# Patient Record
Sex: FEMALE | Race: BLACK OR AFRICAN AMERICAN | Employment: OTHER | URBAN - METROPOLITAN AREA
[De-identification: names, ages, dates, MRNs, and addresses within clinical notes are randomized per-mention and may not be internally consistent; named-entity substitution may affect disease eponyms.]

---

## 2017-02-16 ENCOUNTER — OFFICE VISIT (OUTPATIENT)
Dept: ENDOCRINOLOGY | Age: 62
End: 2017-02-16

## 2017-02-16 VITALS
BODY MASS INDEX: 26.05 KG/M2 | SYSTOLIC BLOOD PRESSURE: 157 MMHG | HEIGHT: 63 IN | WEIGHT: 147 LBS | RESPIRATION RATE: 16 BRPM | DIASTOLIC BLOOD PRESSURE: 59 MMHG | TEMPERATURE: 98.6 F | HEART RATE: 66 BPM

## 2017-02-16 DIAGNOSIS — E78.2 MIXED HYPERLIPIDEMIA: ICD-10-CM

## 2017-02-16 DIAGNOSIS — L97.513 RIGHT FOOT ULCER, WITH NECROSIS OF MUSCLE (HCC): ICD-10-CM

## 2017-02-16 DIAGNOSIS — I10 ESSENTIAL HYPERTENSION: ICD-10-CM

## 2017-02-16 DIAGNOSIS — E10.9 TYPE 1 DIABETES MELLITUS WITHOUT COMPLICATION (HCC): Primary | ICD-10-CM

## 2017-02-16 DIAGNOSIS — I73.9 PAD (PERIPHERAL ARTERY DISEASE) (HCC): ICD-10-CM

## 2017-02-16 LAB
GLUCOSE POC: 125 MG/DL
HBA1C MFR BLD HPLC: 8.2 %

## 2017-02-16 RX ORDER — GABAPENTIN 300 MG/1
CAPSULE ORAL
Qty: 90 CAP | Refills: 6 | Status: SHIPPED | OUTPATIENT
Start: 2017-02-16 | End: 2018-02-14 | Stop reason: SDUPTHER

## 2017-02-16 RX ORDER — FLUCONAZOLE 150 MG/1
TABLET ORAL
Qty: 2 TAB | Refills: 1 | Status: SHIPPED | OUTPATIENT
Start: 2017-02-16 | End: 2018-05-22 | Stop reason: ALTCHOICE

## 2017-02-16 RX ORDER — METFORMIN HYDROCHLORIDE 1000 MG/1
1000 TABLET ORAL 2 TIMES DAILY WITH MEALS
Qty: 60 TAB | Refills: 6 | Status: SHIPPED | OUTPATIENT
Start: 2017-02-16 | End: 2018-02-14 | Stop reason: SDUPTHER

## 2017-02-16 NOTE — PROGRESS NOTES
Eye exam: sept 2016  Foot exam: within last year    Lab Results   Component Value Date/Time    Hemoglobin A1c 7.7 05/18/2015 09:52 AM    Hemoglobin A1c (POC) 7.3 08/31/2016 11:35 AM     Wt Readings from Last 3 Encounters:   02/16/17 147 lb (66.7 kg)   08/31/16 140 lb (63.5 kg)   02/11/16 148 lb (67.1 kg)     Temp Readings from Last 3 Encounters:   02/16/17 98.6 °F (37 °C) (Oral)   08/31/16 97.8 °F (36.6 °C) (Oral)   02/11/16 97.3 °F (36.3 °C) (Oral)     BP Readings from Last 3 Encounters:   02/16/17 157/59   08/31/16 138/74   02/11/16 142/70     Pulse Readings from Last 3 Encounters:   02/16/17 66   08/31/16 72   02/11/16 69

## 2017-02-16 NOTE — MR AVS SNAPSHOT
Visit Information Date & Time Provider Department Dept. Phone Encounter #  
 2/16/2017  3:45 PM Myles Foster MD [facility] [phone] [id number] Follow-up Instructions Return in about 3 months (around 5/16/2017). Upcoming Health Maintenance Date Due Hepatitis C Screening 1955 Pneumococcal 19-64 Medium Risk (1 of 1 - PPSV23) 7/11/1974 DTaP/Tdap/Td series (1 - Tdap) 7/11/1976 PAP AKA CERVICAL CYTOLOGY 7/11/1976 BREAST CANCER SCRN MAMMOGRAM 7/11/2005 FOBT Q 1 YEAR AGE 50-75 7/11/2005 ZOSTER VACCINE AGE 60> 7/11/2015 FOOT EXAM Q1 1/22/2016 LIPID PANEL Q1 5/18/2016 INFLUENZA AGE 9 TO ADULT 8/1/2016 HEMOGLOBIN A1C Q6M 2/28/2017 MICROALBUMIN Q1 8/31/2017 EYE EXAM RETINAL OR DILATED Q1 9/20/2017 Allergies as of 2/16/2017  Review Complete On: 2/16/2017 By: Myles Foster MD  
  
 Severity Noted Reaction Type Reactions Levaquin [Levofloxacin]  10/17/2014    Rash Pcn [Penicillins]  05/07/2012    Hives Robaxin [Methocarbamol]  10/17/2014    Rash Patient not allergic Rocephin [Ceftriaxone]  09/14/2015    Rash Current Immunizations  Never Reviewed No immunizations on file. Not reviewed this visit You Were Diagnosed With   
  
 Codes Comments Type 1 diabetes mellitus without complication (HCC)    -  Primary ICD-10-CM: E10.9 ICD-9-CM: 250.01   
 PAD (peripheral artery disease) (HCC)     ICD-10-CM: I73.9 ICD-9-CM: 443.9 Right foot ulcer, with necrosis of muscle ([facility] [address]     ICD-10-CM: J65.146 ICD-9-CM: 707.15 Vitals BP Pulse Temp Resp Height(growth percentile) Weight(growth percentile) 157/59 (BP 1 Location: Left arm, BP Patient Position: Sitting) 66 98.6 °F (37 °C) (Oral) 16 5' 3\" (1.6 m) 147 lb (66.7 kg) BMI OB Status Smoking Status 26.04 kg/m2 Postmenopausal Current Every Day Smoker Vitals History BMI and BSA Data Body Mass Index Body Surface Area 26.04 kg/m 2 1.72 m 2 Preferred Pharmacy Pharmacy Name Phone [facility], [address] [place] [phone] Your Updated Medication List  
  
   
This list is accurate as of: 2/16/17  4:30 PM.  Always use your most recent med list.  
  
  
  
  
 ANORO ELLIPTA 62.5-25 mcg/actuation inhaler Generic drug:  umeclidinium-vilanterol  
  
 aspirin 81 mg tablet Take 81 mg by mouth daily. FIRST-TESTOSTERONE 2 % Oint Generic drug:  testosterone propionate  
by TransDERmal route. FISH OIL 1,000 mg Cap Generic drug:  omega-3 fatty acids-vitamin e Take 1 Cap by mouth daily. fluconazole 150 mg tablet Commonly known as:  DIFLUCAN Take 1 tablet by mouth for 2 days  
  
 gabapentin 300 mg capsule Commonly known as:  NEURONTIN  
TAKE 1 CAPSULE BY MOUTH IN THE MORNING AND 2 CAPSULES AT BEDTIME  
  
 GINKOBA PO Take 1 Tab by mouth daily. GINSENG PO Take 1 Tab by mouth daily. * glucose blood VI test strips strip Commonly known as:  ONE TOUCH TEST Test 4 times daily, Dx. Code 250.00  
  
 * ONETOUCH ULTRA TEST strip Generic drug:  glucose blood VI test strips USE TO TEST FOUR TIMES DAILY  
  
 * CONTOUR NEXT STRIPS strip Generic drug:  glucose blood VI test strips USE TO TEST FIVE TIMES DAILY  
  
 * insulin detemir 100 unit/mL (3 mL) Inpn Commonly known as:  Rosita Never Inject 20 units at bedtime * LEVEMIR FLEXPEN 100 unit/mL (3 mL) Inpn Generic drug:  insulin detemir INJECT 20 UNITS AT BEDTIME  
  
 * insulin glulisine 100 unit/mL pen Commonly known as:  Asha Whitt Inject 3 units before breakfast, 3 units before lunch, and 5 units before dinner. Plus sliding scale * insulin glulisine 100 unit/mL injection Commonly known as:  APIDRA To use in Pump max daily units 50 * insulin glulisine 100 unit/mL injection Commonly known as:  APIDRA To use in Pump 50 units daily Max Insulin Needles (Disposable) 32 gauge x 1/4\" Ndle Commonly known as:  NOVOFINE 32 Use 4 times daily INVOKANA 300 mg tablet Generic drug:  canagliflozin TAKE 1 TABLET BY MOUTH DAILY STOP INVOKANA 100 MG Iron 325 mg (65 mg iron) tablet Generic drug:  ferrous sulfate Take  by mouth Daily (before breakfast). Lancets Misc Commonly known as: One Touch Norcatur Copping Test 4 times daily, Dx. Code 250.00  
  
 losartan 25 mg tablet Commonly known as:  COZAAR Take  by mouth daily. metFORMIN 1,000 mg tablet Commonly known as:  GLUCOPHAGE Take 1 Tab by mouth two (2) times daily (with meals). Stop Glucovance  
  
 omeprazole 40 mg capsule Commonly known as:  PRILOSEC TK 1 C PO QD  
  
 ONE-A-DAY MENOPAUSE HEALTH PO Take 1 Tab by mouth daily. oxyCODONE-acetaminophen 5-325 mg per tablet Commonly known as:  PERCOCET Take 1 Tab by mouth every eight (8) hours as needed for Pain. Max Daily Amount: 3 Tabs. PROAIR HFA 90 mcg/actuation inhaler Generic drug:  albuterol  
  
 simvastatin 10 mg tablet Commonly known as:  ZOCOR  
  
 tiZANidine 4 mg tablet Commonly known as:  ZANAFLEX  
  
 traZODone 100 mg tablet Commonly known as:  Aj Handy Take 100 mg by mouth nightly. TRINTELLIX 20 mg tablet Generic drug:  vortioxetine Take  by mouth daily. * ESTRACE 0.01 % (0.1 mg/gram) vaginal cream  
Generic drug:  estradiol * Jeanette Reeve Insert  into vagina BID Mon Wed & Fri.  
  
 valACYclovir 500 mg tablet Commonly known as:  VALTREX  
  
 venlafaxine 37.5 mg tablet Commonly known as:  EFFEXOR  
  
 VITAMIN B-12 1,000 mcg tablet Generic drug:  cyanocobalamin Take 5,000 mcg by mouth daily. VITAMIN C 1,000 mg tablet Generic drug:  ascorbic acid (vitamin C) Take  by mouth. VITAMIN D3 1,000 unit tablet Generic drug:  cholecalciferol Take  by mouth daily. * Notice: This list has 10 medication(s) that are the same as other medications prescribed for you. Read the directions carefully, and ask your doctor or other care provider to review them with you. We Performed the Following AMB POC GLUCOSE BLOOD, BY GLUCOSE MONITORING DEVICE [52431 CPT(R)] AMB POC HEMOGLOBIN A1C [88015 CPT(R)] CBC WITH AUTOMATED DIFF [90984 CPT(R)] LIPID PANEL [66989 CPT(R)] METABOLIC PANEL, COMPREHENSIVE [08249 CPT(R)] MICROALBUMIN, UR, RAND W/ MICROALBUMIN/CREA RATIO [id number] CPT(R)] TSH 3RD GENERATION [31436 CPT(R)] Follow-up Instructions Return in about 3 months (around 5/16/2017). To-Do List   
 02/16/2017 Imaging:  ANKLE BRACHIAL INDEX Patient Instructions Keep on diflucan 150 mg once  A day for 2 days  ( 6 refills ) Introducing [facility] SERVICES! Juan Blancas introduces [employer] patient portal. Now you can access parts of your medical record, email your doctor's office, and request medication refills online. 1. In your internet browser, go to [email or web address] 2. Click on the First Time User? Click Here link in the Sign In box. You will see the New Member Sign Up page. 3. Enter your Stretch Access Code exactly as it appears below. You will not need to use this code after youve completed the sign-up process. If you do not sign up before the expiration date, you must request a new code. · St[employer] Access Code: [email or web address] Expires: 5/17/2017  4:29 PM 
 
4. Enter the last four digits of your Social Security Number (xxxx) and Date of Birth (mm/dd/yyyy) as indicated and click Submit. You will be taken to the next sign-up page. 5. Create a Stretch ID. This will be your Stretch login ID and cannot be changed, so think of one that is secure and easy to remember. 6. Create a Stretch password. You can change your password at any time. 7. Enter your Password Reset Question and Answer. This can be used at a later time if you forget your password. 8. Enter your e-mail address. You will receive e-mail notification when new information is available in [address] 9. Click Sign Up. You can now view and download portions of your medical record. 10. Click the Download Summary menu link to download a portable copy of your medical information. If you have questions, please visit the Frequently Asked Questions section of the [facility] website. Remember, [facility] is NOT to be used for urgent needs. For medical emergencies, dial 911. Now available from your iPhone and Android! Please provide this summary of care documentation to your next provider. Your primary care clinician is listed as Tara Flores. If you have any questions after today's visit, please call [phone].

## 2017-02-16 NOTE — PROGRESS NOTES
HISTORY OF PRESENT ILLNESS     Nayeli Doty is a 64 y.o. female. HPI   Patient is here for f/u visit of Type 2 diabetes mellitus after aug  2016    She got started on insulin pump May 28 2015  Gained 7  lbs     She has a right foot issue being dealt by podiatrist - dec 2016    She has pain in both legs for a year     She continues to feel  depressed   She has not checked sugars for some time   She is smoking    She got off tegretol   She is f/u Dr Yolette Hernandez    She has good log and not many low sugars       Prior history . H/o DM 2 for 17 years   Current A1C is 12 % and symptoms/problems include fluctuating blood sugars   Current diabetic medications include Lantus 15 units and glucovance   Current monitoring regimen: home blood tests - 2 times daily   Home blood sugar records: trend: fluctuating a lot   Any episodes of hypoglycemia? no         Review of Systems   Constitutional: Negative. HENT: Negative. Eyes: Negative for pain and redness. Respiratory: Negative. Cardiovascular: Negative for chest pain, palpitations and leg swelling. Gastrointestinal: Negative. Negative for constipation. Genitourinary: Negative. Musculoskeletal: Negative for myalgias. Skin: Negative. Neurological: Negative. Endo/Heme/Allergies: Negative. Psychiatric/Behavioral: Negative for depression and memory loss. The patient does not have insomnia. Physical Exam   Constitutional: She is oriented to person, place, and time. She appears well-developed and well-nourished. HENT:   Head: Normocephalic. Eyes: Conjunctivae and EOM are normal. Pupils are equal, round, and reactive to light. Neck: Normal range of motion. Neck supple. No JVD present. No tracheal deviation present. No thyromegaly present. Cardiovascular: Normal rate, regular rhythm and normal heart sounds. No murmur heard. Pulmonary/Chest: Breath sounds normal.   Abdominal: Soft.  Bowel sounds are normal.   Musculoskeletal: Normal range of motion. Lymphadenopathy:   She has no cervical adenopathy. Neurological: She is alert and oriented to person, place, and time. She has normal reflexes. Skin: Skin is warm. Psychiatric: She has a normal mood and affect. S/p right  foot -  surgery   podiatrist - Dr. Robson Liang       Lab Results   Component Value Date/Time    Hemoglobin A1c 7.7 05/18/2015 09:52 AM    Hemoglobin A1c 9.2 02/03/2015 10:08 AM    Hemoglobin A1c 9.1 05/01/2014 09:10 AM    Glucose 130 08/31/2016 12:06 PM    Glucose (POC) 158 03/15/2013 11:14 AM    Glucose  02/16/2017 03:53 PM    Microalb/Creat ratio (ug/mg creat.) 163.5 08/31/2016 12:06 PM    LDL, calculated 80 05/18/2015 09:52 AM    Creatinine 0.67 08/31/2016 12:06 PM      Lab Results   Component Value Date/Time    Cholesterol, total 152 05/18/2015 09:52 AM    HDL Cholesterol 47 05/18/2015 09:52 AM    LDL, calculated 80 05/18/2015 09:52 AM    Triglyceride 126 05/18/2015 09:52 AM       Lab Results   Component Value Date/Time    ALT (SGPT) 15 08/31/2016 12:06 PM    AST (SGOT) 19 08/31/2016 12:06 PM    Alk. phosphatase 123 08/31/2016 12:06 PM    Bilirubin, total 0.4 08/31/2016 12:06 PM       Lab Results   Component Value Date/Time    GFR est  08/31/2016 12:06 PM    GFR est non-AA 95 08/31/2016 12:06 PM    Creatinine 0.67 08/31/2016 12:06 PM    BUN 9 08/31/2016 12:06 PM    Sodium 142 08/31/2016 12:06 PM    Potassium 4.1 08/31/2016 12:06 PM    Chloride 100 08/31/2016 12:06 PM    CO2 26 08/31/2016 12:06 PM                 ASSESSMENT and PLAN     1.  Type 2 DM, un controlled with type 1 behaviour  : A1c is   8.2 %    From  Feb 2017   Compared to     7.2 %    From today aug 2016  Compared to  7.7 %     From   May 2016  Compared to  6.3 %      From      Sept 2015   Compared to   7.7 %  From  May 2015 ;    9.2 %    From feb 2015  Compared to   9.1 %    From oct 2014  compared to  9.1 % from May 2014 compared to  8. 3 % from feb 2014 compared to 8.8 % June 2013 compared to 7.6 % from aug 2012 ; Compared to  over 12 % from may 2012 to current a1c is 7      Doing well on the pump - started in may 2015  Lost control as she does  not   Do  many checks and intake of insulin  Log showed  good sugars   She is forget ful    She is taking only 12 to 20 units  A day   Explained how she could be having post meal hyperglycemias   She is on meal time insulin and she requires lot of understanding of the need for meal time insulin, and low pancreatic function  She has done so  better on pump     Continue  invokana -- but its role is questionable and she decided to stay on it   Continue on metformin    LOW c-pep , AMANDA negative      advised about checking blood sugars 4 times a day and maintaining log book. The danger of having low blood sugars has been explained with inappropriate use of insulin Patient voiced understanding and using the printed instructions at home. Hypoglycemia management has been explained to the patient. lab results and schedule of future lab studies reviewed with patient     2. Hypoglycemia : provided the education    3. HTN : on cozaar   , Proteinuria noticeable   Lisinopril stopped for cough      4. Dyslipidemia : continue zocor. Patient is educated about benefits and adverse effects of statins and explained how benefits outweigh risk. 5. use of aspirin to prevent MI and TIA's discussed     6. On estrogen  Requesting refills on diflucan      7. Neuropathy :  She says she has no relief On  gabapentin to 300 AM  and 600 hs ( from 100, 100, 300 mg   Dosing)   Stopped  tramadol 50mg        8. Depression : she is f/u with Dr. Jose Nicole   She works at [employer] and she thinks that her depression is much deeper   She is on effexor   Suggesting vibryid       10.    PAD - Right foot recent surgery , she has pain constant in the leg   She is a smoker   Check out vascular status   Ordered CHRISTY   definitely needs vascular assessment given all the risk factors , smoking, diabetes, neuropathy , PAD  Refer to vascular surgeon         > 50 % visit time spent on counseling

## 2017-02-17 LAB
ALBUMIN SERPL-MCNC: 4 G/DL (ref 3.6–4.8)
ALBUMIN/CREAT UR: 278.2 MG/G CREAT (ref 0–30)
ALBUMIN/GLOB SERPL: 1.2 {RATIO} (ref 1.1–2.5)
ALP SERPL-CCNC: 120 IU/L (ref 39–117)
ALT SERPL-CCNC: 15 IU/L (ref 0–32)
AST SERPL-CCNC: 19 IU/L (ref 0–40)
BASOPHILS # BLD AUTO: 0 X10E3/UL (ref 0–0.2)
BASOPHILS NFR BLD AUTO: 0 %
BILIRUB SERPL-MCNC: 0.2 MG/DL (ref 0–1.2)
BUN SERPL-MCNC: 11 MG/DL (ref 8–27)
BUN/CREAT SERPL: 18 (ref 11–26)
CALCIUM SERPL-MCNC: 10.3 MG/DL (ref 8.7–10.3)
CHLORIDE SERPL-SCNC: 101 MMOL/L (ref 96–106)
CHOLEST SERPL-MCNC: 153 MG/DL (ref 100–199)
CO2 SERPL-SCNC: 23 MMOL/L (ref 18–29)
CREAT SERPL-MCNC: 0.61 MG/DL (ref 0.57–1)
CREAT UR-MCNC: 24.3 MG/DL
EOSINOPHIL # BLD AUTO: 0.1 X10E3/UL (ref 0–0.4)
EOSINOPHIL NFR BLD AUTO: 1 %
ERYTHROCYTE [DISTWIDTH] IN BLOOD BY AUTOMATED COUNT: 12.9 % (ref 12.3–15.4)
GLOBULIN SER CALC-MCNC: 3.3 G/DL (ref 1.5–4.5)
GLUCOSE SERPL-MCNC: 102 MG/DL (ref 65–99)
HCT VFR BLD AUTO: 45.1 % (ref 34–46.6)
HDLC SERPL-MCNC: 55 MG/DL
HGB BLD-MCNC: 15.2 G/DL (ref 11.1–15.9)
IMM GRANULOCYTES # BLD: 0.1 X10E3/UL (ref 0–0.1)
IMM GRANULOCYTES NFR BLD: 1 %
INTERPRETATION, 910389: NORMAL
LDLC SERPL CALC-MCNC: 77 MG/DL (ref 0–99)
LYMPHOCYTES # BLD AUTO: 2.9 X10E3/UL (ref 0.7–3.1)
LYMPHOCYTES NFR BLD AUTO: 41 %
MCH RBC QN AUTO: 29.6 PG (ref 26.6–33)
MCHC RBC AUTO-ENTMCNC: 33.7 G/DL (ref 31.5–35.7)
MCV RBC AUTO: 88 FL (ref 79–97)
MICROALBUMIN UR-MCNC: 67.6 UG/ML
MONOCYTES # BLD AUTO: 0.6 X10E3/UL (ref 0.1–0.9)
MONOCYTES NFR BLD AUTO: 9 %
NEUTROPHILS # BLD AUTO: 3.2 X10E3/UL (ref 1.4–7)
NEUTROPHILS NFR BLD AUTO: 48 %
PLATELET # BLD AUTO: 353 X10E3/UL (ref 150–379)
POTASSIUM SERPL-SCNC: 5.1 MMOL/L (ref 3.5–5.2)
PROT SERPL-MCNC: 7.3 G/DL (ref 6–8.5)
RBC # BLD AUTO: 5.13 X10E6/UL (ref 3.77–5.28)
SODIUM SERPL-SCNC: 142 MMOL/L (ref 134–144)
TRIGL SERPL-MCNC: 107 MG/DL (ref 0–149)
TSH SERPL DL<=0.005 MIU/L-ACNC: 1.44 UIU/ML (ref 0.45–4.5)
VLDLC SERPL CALC-MCNC: 21 MG/DL (ref 5–40)
WBC # BLD AUTO: 6.9 X10E3/UL (ref 3.4–10.8)

## 2017-02-24 ENCOUNTER — HOSPITAL ENCOUNTER (OUTPATIENT)
Dept: VASCULAR SURGERY | Age: 62
Discharge: HOME | End: 2017-02-24
Attending: INTERNAL MEDICINE
Payer: COMMERCIAL

## 2017-02-24 DIAGNOSIS — L97.513 RIGHT FOOT ULCER, WITH NECROSIS OF MUSCLE (HCC): ICD-10-CM

## 2017-02-24 DIAGNOSIS — I73.9 PAD (PERIPHERAL ARTERY DISEASE) (HCC): ICD-10-CM

## 2017-02-24 DIAGNOSIS — E10.9 TYPE 1 DIABETES MELLITUS WITHOUT COMPLICATION (HCC): ICD-10-CM

## 2017-02-24 PROCEDURE — 93923 UPR/LXTR ART STDY 3+ LVLS: CPT

## 2017-02-24 NOTE — PROCEDURES
[facility]  *** FINAL REPORT ***    Name: Nj Zimmerman  MRN: [id number]    Outpatient  : 1955  HIS Order #: [id number]  [id number] [facility] Visit #: [id number]  Date: 2017    TYPE OF TEST: Peripheral Arterial Testing    REASON FOR TEST  Claudication, Extremity ulceration    Right Leg  Segmentals: Abnormal                     mmHg  Brachial         139  High thigh       141  Low thigh        127  Calf              65  Posterior tibial  81  Dorsalis pedis    64  Peroneal  Metatarsal  Toe pressure  Doppler:  PVR:  Ankle/Brachial: 0.58    Left Leg  Segmentals: Abnormal                     mmHg  Brachial         139  High thigh       131  Low thigh         90  Calf              68  Posterior tibial  72  Dorsalis pedis    63  Peroneal  Metatarsal  Toe pressure  Doppler:  PVR:  Ankle/Brachial: 0.52    INTERPRETATION/FINDINGS  PROCEDURE:  Evaluation of lower extremity arteries with multilevel  systolic blood pressure measurements and pulse volume recording (PVR)  plethysmography. Includes calculation of the ankle/brachial pressure  indices (CHRISTY's). FINDINGS:  1. Moderate peripheral arterial disease indicated at rest in the right   leg. 2. Moderate peripheral arterial disease indicated at rest in the left  leg. 3. The right ankle/brachial index is 0.58 and the left ankle/brachial  index is 0.52.  4. The right toe/ankle brachial index is 0.44 and the left  toe/brachial index is 0.37. ADDITIONAL COMMENTS    I have personally reviewed the data relevant to the interpretation of  this  study. TECHNOLOGIST: Sherly Moya RVT  Signed: 2017 11:20 AM    PHYSICIAN: Jose Chow.  Amilcar Ortiz MD  Signed: 2017 09:02 AM

## 2017-02-28 NOTE — PROGRESS NOTES
Please inform pt that the CHRISTY test was done by the Dr. Neno Magaña I am planning to refer her to   So, mirza has to make appt for her.  I told mirza already

## 2017-03-02 ENCOUNTER — TELEPHONE (OUTPATIENT)
Dept: ENDOCRINOLOGY | Age: 62
End: 2017-03-02

## 2017-03-02 NOTE — TELEPHONE ENCOUNTER
Patient called and would like results of the test she had done at [facility]. Please call patient.

## 2017-03-03 NOTE — TELEPHONE ENCOUNTER
Notes Recorded by Eulalio Randolph MD on 2/27/2017 at 10:43 PM  Please inform pt that the CHRISTY test was done by the Dr. Sarwat Quinteros I am planning to refer her to   So, mirza has to make appt for her.  I told mirza already    Informed pt

## 2017-03-14 ENCOUNTER — TELEPHONE (OUTPATIENT)
Dept: ENDOCRINOLOGY | Age: 62
End: 2017-03-14

## 2017-03-14 NOTE — TELEPHONE ENCOUNTER
----- Message from Myles Foster MD sent at 2/27/2017 10:41 PM EST -----      Refer to Dr. Jolynn Romero for managing PAD

## 2017-05-03 ENCOUNTER — TELEPHONE (OUTPATIENT)
Dept: ENDOCRINOLOGY | Age: 62
End: 2017-05-03

## 2017-05-03 NOTE — TELEPHONE ENCOUNTER
Patient says Jayleen Quinn requires a prior authorization. Patient says she is completley out of medication .

## 2017-05-15 ENCOUNTER — OFFICE VISIT (OUTPATIENT)
Dept: ENDOCRINOLOGY | Age: 62
End: 2017-05-15

## 2017-05-15 VITALS
DIASTOLIC BLOOD PRESSURE: 72 MMHG | TEMPERATURE: 96.6 F | BODY MASS INDEX: 24.8 KG/M2 | SYSTOLIC BLOOD PRESSURE: 146 MMHG | HEIGHT: 63 IN | WEIGHT: 140 LBS | HEART RATE: 70 BPM | RESPIRATION RATE: 20 BRPM

## 2017-05-15 DIAGNOSIS — E10.9 TYPE 1 DIABETES MELLITUS WITHOUT COMPLICATION (HCC): Primary | ICD-10-CM

## 2017-05-15 DIAGNOSIS — I10 ESSENTIAL HYPERTENSION: ICD-10-CM

## 2017-05-15 DIAGNOSIS — Z96.41 INSULIN PUMP STATUS: ICD-10-CM

## 2017-05-15 DIAGNOSIS — I73.9 PAD (PERIPHERAL ARTERY DISEASE) (HCC): ICD-10-CM

## 2017-05-15 DIAGNOSIS — E78.2 MIXED HYPERLIPIDEMIA: ICD-10-CM

## 2017-05-15 LAB — HBA1C MFR BLD HPLC: 7.6 %

## 2017-05-15 NOTE — MR AVS SNAPSHOT
Visit Information Date & Time Provider Department Dept. Phone Encounter #  
 5/15/2017 10:45 AM Danica Martinez MD [facility] Diabetes & Endocrinology [phone] [id number] Follow-up Instructions Return in about 3 months (around 8/15/2017). Upcoming Health Maintenance Date Due Hepatitis C Screening 1955 Pneumococcal 19-64 Medium Risk (1 of 1 - PPSV23) 7/11/1974 DTaP/Tdap/Td series (1 - Tdap) 7/11/1976 PAP AKA CERVICAL CYTOLOGY 7/11/1976 BREAST CANCER SCRN MAMMOGRAM 7/11/2005 FOBT Q 1 YEAR AGE 50-75 7/11/2005 ZOSTER VACCINE AGE 60> 7/11/2015 FOOT EXAM Q1 1/22/2016 INFLUENZA AGE 9 TO ADULT 8/1/2017 HEMOGLOBIN A1C Q6M 8/16/2017 EYE EXAM RETINAL OR DILATED Q1 9/20/2017 MICROALBUMIN Q1 2/16/2018 LIPID PANEL Q1 2/16/2018 Allergies as of 5/15/2017  Review Complete On: 5/15/2017 By: Danica Martinez MD  
  
 Severity Noted Reaction Type Reactions Levaquin [Levofloxacin]  10/17/2014    Rash Pcn [Penicillins]  05/07/2012    Hives Robaxin [Methocarbamol]  10/17/2014    Rash Patient not allergic Rocephin [Ceftriaxone]  09/14/2015    Rash Current Immunizations  Never Reviewed No immunizations on file. Not reviewed this visit You Were Diagnosed With   
  
 Codes Comments Type 1 diabetes mellitus without complication (HCC)    -  Primary ICD-10-CM: E10.9 ICD-9-CM: 250.01 Insulin pump status     ICD-10-CM: Z96.41 
ICD-9-CM: V45.85 PAD (peripheral artery disease) (HCC)     ICD-10-CM: I73.9 ICD-9-CM: 443.9 Essential hypertension     ICD-10-CM: I10 
ICD-9-CM: 401.9 Mixed hyperlipidemia     ICD-10-CM: E78.2 ICD-9-CM: 272.2 Vitals BP Pulse Temp Resp Height(growth percentile) Weight(growth percentile) 146/72 70 96.6 °F (35.9 °C) (Oral) 20 5' 3\" (1.6 m) 140 lb (63.5 kg) BMI OB Status Smoking Status 24.8 kg/m2 Postmenopausal Current Every Day Smoker BMI and BSA Data Body Mass Index Body Surface Area  
 24.8 kg/m 2 1.68 m 2 Preferred Pharmacy Pharmacy Name Phone [facility], [address] María Mejia [phone] Your Updated Medication List  
  
   
This list is accurate as of: 5/15/17 11:51 AM.  Always use your most recent med list.  
  
  
  
  
 ANORO ELLIPTA 62.5-25 mcg/actuation inhaler Generic drug:  umeclidinium-vilanterol  
  
 aspirin 81 mg tablet Take 81 mg by mouth daily. empagliflozin 25 mg tablet Commonly known as:  Unk Prima Take 1 Tab by mouth daily. Stop invokana FIRST-TESTOSTERONE 2 % Oint Generic drug:  testosterone propionate  
by TransDERmal route. FISH OIL 1,000 mg Cap Generic drug:  omega-3 fatty acids-vitamin e Take 1 Cap by mouth daily. fluconazole 150 mg tablet Commonly known as:  DIFLUCAN Take 1 tablet by mouth for 2 days  
  
 gabapentin 300 mg capsule Commonly known as:  NEURONTIN  
TAKE 1 CAPSULE BY MOUTH IN THE MORNING AND 2 CAPSULES AT BEDTIME  
  
 * glucose blood VI test strips strip Commonly known as:  ONE TOUCH TEST Test 4 times daily, Dx. Code 250.00  
  
 * ONETOUCH ULTRA TEST strip Generic drug:  glucose blood VI test strips USE TO TEST FOUR TIMES DAILY  
  
 * CONTOUR NEXT STRIPS strip Generic drug:  glucose blood VI test strips USE TO TEST FIVE TIMES DAILY  
  
 * insulin detemir 100 unit/mL (3 mL) Inpn Commonly known as:  Catherine Kennel Inject 20 units at bedtime * LEVEMIR FLEXPEN 100 unit/mL (3 mL) Inpn Generic drug:  insulin detemir INJECT 20 UNITS AT BEDTIME  
  
 * insulin glulisine 100 unit/mL pen Commonly known as:  Erin Patel Inject 3 units before breakfast, 3 units before lunch, and 5 units before dinner. Plus sliding scale * insulin glulisine 100 unit/mL injection Commonly known as:  APIDRA To use in Pump max daily units 50 * insulin glulisine 100 unit/mL injection Commonly known as:  APIDRA To use in Pump 50 units daily Max Insulin Needles (Disposable) 32 gauge x 1/4\" Ndle Commonly known as:  NOVOFINE 32 Use 4 times daily Iron 325 mg (65 mg iron) tablet Generic drug:  ferrous sulfate Take  by mouth Daily (before breakfast). Lancets Misc Commonly known as: One Touch Deborah Primus Test 4 times daily, Dx. Code 250.00  
  
 losartan 25 mg tablet Commonly known as:  COZAAR Take  by mouth daily. metFORMIN 1,000 mg tablet Commonly known as:  GLUCOPHAGE Take 1 Tab by mouth two (2) times daily (with meals). Stop Glucovance  
  
 omeprazole 40 mg capsule Commonly known as:  PRILOSEC TK 1 C PO QD  
  
 ONE-A-DAY MENOPAUSE HEALTH PO Take 1 Tab by mouth daily. oxyCODONE-acetaminophen 5-325 mg per tablet Commonly known as:  PERCOCET Take 1 Tab by mouth every eight (8) hours as needed for Pain. Max Daily Amount: 3 Tabs. PROAIR HFA 90 mcg/actuation inhaler Generic drug:  albuterol  
  
 simvastatin 10 mg tablet Commonly known as:  ZOCOR  
  
 tiZANidine 4 mg tablet Commonly known as:  ZANAFLEX  
  
 traZODone 100 mg tablet Commonly known as:  Merleen Cables Take 100 mg by mouth nightly. * ESTRACE 0.01 % (0.1 mg/gram) vaginal cream  
Generic drug:  estradiol * Yun Christin Insert  into vagina BID Mon Wed & Fri.  
  
 valACYclovir 500 mg tablet Commonly known as:  VALTREX  
  
 VITAMIN B-12 1,000 mcg tablet Generic drug:  cyanocobalamin Take 5,000 mcg by mouth daily. VITAMIN C 1,000 mg tablet Generic drug:  ascorbic acid (vitamin C) Take  by mouth. VITAMIN D3 1,000 unit tablet Generic drug:  cholecalciferol Take  by mouth daily. * Notice: This list has 10 medication(s) that are the same as other medications prescribed for you. Read the directions carefully, and ask your doctor or other care provider to review them with you. Prescriptions Sent to Pharmacy Refills  
 empagliflozin (JARDIANCE) 25 mg tablet 6 Sig: Take 1 Tab by mouth daily. Stop invokana Class: Normal  
 Pharmacy: [facility] [address] [place] [address] AT [facility] [address] [facility] #: [phone] Route: Oral  
  
We Performed the Following AMB POC HEMOGLOBIN A1C [97071 CPT(R)] Follow-up Instructions Return in about 3 months (around 8/15/2017). Patient Instructions Pump -- apidra 2 vials a month ( used 50 units a day ) Continue  neurontin 300 mg AM,    and  600 mg at bed time Check blood sugars immediately before each meal and at bedtime Continue metformin Stop invokana 300 mg a day Start on jardiance 25 mg a day before b-fast  
 
 
 
 
BACK UP :  
 
Take Levemir insulin 20 units at bed time Take Apidra  insulin 3 units before breakfast, 3 units before lunch and 5 units before dinner. Also, add additional Apidra    as follows with meals  If blood sugars are[de-identified] 
 
150-200 mg 1 units 201-250 mg 2 units 251-300 mg 3 units 301-350 mg 4 units 351-400 mg 5 units 401-450 mg 6 units 451-500 mg 7 units Less than 70 mg NO INSULIN 
 
 
 
No grapes, oranges, peaches and pineapple Low on starches-- carbs, pasta rice and potatoes No snacks in between meal times Introducing [facility]! Dear Lyssa Uribe: 
Thank you for requesting a [employer] account. Our records indicate that you already have an active [employer] account. You can access your account anytime at [email or web address] Did you know that you can access your hospital and ER discharge instructions at any time in Col[employer]? You can also review all of your test results from your hospital stay or ER visit. Additional Information If you have questions, please visit the Frequently Asked Questions section of the [employer] website at [email or web address] Remember, [employer] is NOT to be used for urgent needs. For medical emergencies, dial 911. Now available from your iPhone and Android! Please provide this summary of care documentation to your next provider. Your primary care clinician is listed as Bo Nava. If you have any questions after today's visit, please call [phone].

## 2017-05-15 NOTE — PROGRESS NOTES
Wt Readings from Last 3 Encounters:   05/15/17 140 lb (63.5 kg)   02/16/17 147 lb (66.7 kg)   08/31/16 140 lb (63.5 kg)     Temp Readings from Last 3 Encounters:   05/15/17 96.6 °F (35.9 °C) (Oral)   02/16/17 98.6 °F (37 °C) (Oral)   08/31/16 97.8 °F (36.6 °C) (Oral)     BP Readings from Last 3 Encounters:   05/15/17 146/72   02/16/17 157/59   08/31/16 138/74     Pulse Readings from Last 3 Encounters:   05/15/17 70   02/16/17 66   08/31/16 72     Lab Results   Component Value Date/Time    Hemoglobin A1c 7.7 05/18/2015 09:52 AM    Hemoglobin A1c (POC) 8.2 02/16/2017 03:53 PM     Last Podiatry March 2017  Last Eye exam Aug 2016

## 2017-05-15 NOTE — PATIENT INSTRUCTIONS
Pump -- apidra 2 vials a month ( used 50 units a day )      Continue  neurontin 300 mg AM,    and  600 mg at bed time         Check blood sugars immediately before each meal and at bedtime       Continue metformin    Stop invokana 300 mg a day   Start on jardiance 25 mg a day before b-fast           BACK UP :     Take Levemir insulin 20 units at bed time       Take Apidra  insulin 3 units before breakfast, 3 units before lunch and 5 units before dinner.     Also, add additional Apidra    as follows with meals  If blood sugars are[de-identified]    150-200 mg 1 units    201-250 mg 2 units    251-300 mg 3 units    301-350 mg 4 units    351-400 mg 5 units    401-450 mg 6 units    451-500 mg 7 units     Less than 70 mg NO INSULIN        No grapes, oranges, peaches and pineapple   Low on starches-- carbs, pasta rice and potatoes   No snacks in between meal times

## 2017-05-15 NOTE — PROGRESS NOTES
HISTORY OF PRESENT ILLNESS     Unruly Andrea is a 64 y.o. female. HPI   Patient is here for f/u visit of Type 2 diabetes mellitus after feb 2017         She got started on insulin pump May 28 2015  Lost  7  lbs   She thinks she is gaining weight   She is not happy that she is to change to jardiance  From invokana     She saw the vascular surgeon, Dr. Linda Goodson,  She has clogged artery on right leg     Going for stent put in    She is doing better with checks   She is smoking    She got off tegretol   She is f/u Dr Mildred Anderson    She has good log and not many low sugars       Prior history . H/o DM 2 for 17 years   Current A1C is 12 % and symptoms/problems include fluctuating blood sugars   Current diabetic medications include Lantus 15 units and glucovance   Current monitoring regimen: home blood tests - 2 times daily   Home blood sugar records: trend: fluctuating a lot   Any episodes of hypoglycemia? no         Review of Systems   Constitutional: Negative. HENT: Negative. Eyes: Negative for pain and redness. Respiratory: Negative. Cardiovascular: Negative for chest pain, palpitations and leg swelling. Gastrointestinal: Negative. Negative for constipation. Genitourinary: Negative. Musculoskeletal: Negative for myalgias. Skin: Negative. Neurological: Negative. Endo/Heme/Allergies: Negative. Psychiatric/Behavioral: Negative for depression and memory loss. The patient does not have insomnia. Physical Exam   Constitutional: She is oriented to person, place, and time. She appears well-developed and well-nourished. HENT:   Head: Normocephalic. Eyes: Conjunctivae and EOM are normal. Pupils are equal, round, and reactive to light. Neck: Normal range of motion. Neck supple. No JVD present. No tracheal deviation present. No thyromegaly present. Cardiovascular: Normal rate, regular rhythm and normal heart sounds. No murmur heard.    Pulmonary/Chest: Breath sounds normal.   Abdominal: Soft. Bowel sounds are normal.   Musculoskeletal: Normal range of motion. Lymphadenopathy:   She has no cervical adenopathy. Neurological: She is alert and oriented to person, place, and time. She has normal reflexes. Skin: Skin is warm. Psychiatric: She has a normal mood and affect. S/p right  foot -  surgery   podiatrist - Dr. Cruz Sprain       Lab Results   Component Value Date/Time    Hemoglobin A1c 7.7 05/18/2015 09:52 AM    Hemoglobin A1c 9.2 02/03/2015 10:08 AM    Hemoglobin A1c 9.1 05/01/2014 09:10 AM    Glucose 102 02/16/2017 04:33 PM    Glucose (POC) 158 03/15/2013 11:14 AM    Glucose  02/16/2017 03:53 PM    Microalb/Creat ratio (ug/mg creat.) 278.2 02/16/2017 04:33 PM    LDL, calculated 77 02/16/2017 04:33 PM    Creatinine 0.61 02/16/2017 04:33 PM      Lab Results   Component Value Date/Time    Cholesterol, total 153 02/16/2017 04:33 PM    HDL Cholesterol 55 02/16/2017 04:33 PM    LDL, calculated 77 02/16/2017 04:33 PM    Triglyceride 107 02/16/2017 04:33 PM       Lab Results   Component Value Date/Time    ALT (SGPT) 15 02/16/2017 04:33 PM    AST (SGOT) 19 02/16/2017 04:33 PM    Alk. phosphatase 120 02/16/2017 04:33 PM    Bilirubin, total 0.2 02/16/2017 04:33 PM       Lab Results   Component Value Date/Time    GFR est  02/16/2017 04:33 PM    GFR est non-AA 98 02/16/2017 04:33 PM    Creatinine 0.61 02/16/2017 04:33 PM    BUN 11 02/16/2017 04:33 PM    Sodium 142 02/16/2017 04:33 PM    Potassium 5.1 02/16/2017 04:33 PM    Chloride 101 02/16/2017 04:33 PM    CO2 23 02/16/2017 04:33 PM                 ASSESSMENT and PLAN     1.  Type 2 DM, un controlled with type 1 behaviour  : A1c is  7.7 %     From    Today May 2017    compared to   8.2 %    From  Feb 2017   Compared to     7.2 %    From today aug 2016  Compared to  7.7 %     From   May 2016  Compared to  6.3 %      From      Sept 2015   Compared to   7.7 %  From  May 2015 ;    9.2 %    From feb 2015  Compared to   9.1 %    From oct 2014  compared to  9.1 % from May 2014 compared to  8. 3 % from feb 2014 compared to 8.8 % June 2013 compared to 7.6 % from aug 2012 ; Compared to  over 12 % from may 2012 to current a1c is 7        Glycemic control is better   Doing well on the pump - started in may 2015  Likely form more  checks and intake of insulin  Log showed  good sugars       She is taking only 12  units  A day   She is on meal time insulin and she requires lot of understanding of the need for meal time insulin, and low pancreatic function  She has done so  better on pump     Continue  invokana -- but its role is questionable and she decided to stay on it   Continue on metformin    LOW c-pep , AMANDA negative      advised about checking blood sugars 4 times a day and maintaining log book. The danger of having low blood sugars has been explained with inappropriate use of insulin Patient voiced understanding and using the printed instructions at home. Hypoglycemia management has been explained to the patient. lab results and schedule of future lab studies reviewed with patient     2. Hypoglycemia : provided the education    3. HTN : on cozaar   , Proteinuria noticeable   Lisinopril stopped for cough      4. Dyslipidemia : continue zocor. Patient is educated about benefits and adverse effects of statins and explained how benefits outweigh risk. 5. use of aspirin to prevent MI and TIA's discussed     6. On estrogen  Requesting refills on diflucan      7. Neuropathy :  She says she has no relief On  gabapentin to 300 AM  and 600 hs ( from 100, 100, 300 mg   Dosing)   Stopped  tramadol 50mg        8. Depression : she is f/u with Dr. Farnsworth Gloss   She works at [employer] and she thinks that her depression is much deeper   She is on effexor   Suggesting vibryid       10.    PAD - Right foot recent surgery , she has pain constant in the leg   She is a smoker   Got Checked out vascular status by Dr. Mark Rocha for stent placement definitely needs vascular assessment given all the risk factors , smoking, diabetes, neuropathy , PAD  Refer to vascular surgeon         > 50 % visit time spent on counseling

## 2017-09-05 RX ORDER — INSULIN GLULISINE 100 [IU]/ML
INJECTION, SOLUTION SUBCUTANEOUS
Qty: 20 ML | Refills: 0 | Status: SHIPPED | OUTPATIENT
Start: 2017-09-05 | End: 2017-09-18 | Stop reason: SDUPTHER

## 2017-09-08 RX ORDER — INSULIN ASPART 100 [IU]/ML
INJECTION, SOLUTION INTRAVENOUS; SUBCUTANEOUS
Qty: 20 ML | Refills: 6 | Status: SHIPPED | OUTPATIENT
Start: 2017-09-08 | End: 2018-10-16 | Stop reason: ALTCHOICE

## 2017-09-12 DIAGNOSIS — E11.65 TYPE 2 DIABETES MELLITUS WITH HYPERGLYCEMIA, WITH LONG-TERM CURRENT USE OF INSULIN (HCC): Primary | ICD-10-CM

## 2017-09-12 DIAGNOSIS — Z79.4 TYPE 2 DIABETES MELLITUS WITH HYPERGLYCEMIA, WITH LONG-TERM CURRENT USE OF INSULIN (HCC): Primary | ICD-10-CM

## 2017-09-12 RX ORDER — INSULIN LISPRO 100 [IU]/ML
INJECTION, SOLUTION INTRAVENOUS; SUBCUTANEOUS
Qty: 2 VIAL | Refills: 6 | Status: SHIPPED | OUTPATIENT
Start: 2017-09-12 | End: 2018-10-16 | Stop reason: ALTCHOICE

## 2017-09-18 DIAGNOSIS — Z79.4 TYPE 2 DIABETES MELLITUS WITH HYPERGLYCEMIA, WITH LONG-TERM CURRENT USE OF INSULIN (HCC): Primary | ICD-10-CM

## 2017-09-18 DIAGNOSIS — E11.65 TYPE 2 DIABETES MELLITUS WITH HYPERGLYCEMIA, WITH LONG-TERM CURRENT USE OF INSULIN (HCC): Primary | ICD-10-CM

## 2017-09-18 NOTE — TELEPHONE ENCOUNTER
Spoke with patient and she asked why Arlin Muñoz was not covered. Explained to patient it is not formulary for her insurance and they cover humalog. Patient stated she was on humalog years ago and it caused her to gain weight. Told patient would complete a PA for Apidra with that information and will update her on outcome. Called patient and informed her Apidra is approved from 09/18/2017-08/18/2018. She verbalized understanding. Will resend Apidra prescription to pharmacy.

## 2017-11-03 RX ORDER — BLOOD SUGAR DIAGNOSTIC
STRIP MISCELLANEOUS
Qty: 200 STRIP | Refills: 0 | Status: SHIPPED | OUTPATIENT
Start: 2017-11-03 | End: 2019-08-12 | Stop reason: SDUPTHER

## 2018-02-14 ENCOUNTER — OFFICE VISIT (OUTPATIENT)
Dept: ENDOCRINOLOGY | Age: 63
End: 2018-02-14

## 2018-02-14 VITALS
RESPIRATION RATE: 18 BRPM | WEIGHT: 133.3 LBS | HEART RATE: 86 BPM | OXYGEN SATURATION: 98 % | TEMPERATURE: 98 F | BODY MASS INDEX: 23.62 KG/M2 | SYSTOLIC BLOOD PRESSURE: 168 MMHG | HEIGHT: 63 IN | DIASTOLIC BLOOD PRESSURE: 81 MMHG

## 2018-02-14 DIAGNOSIS — I10 ESSENTIAL HYPERTENSION: ICD-10-CM

## 2018-02-14 DIAGNOSIS — Z91.199 NONCOMPLIANCE: ICD-10-CM

## 2018-02-14 DIAGNOSIS — E78.2 MIXED HYPERLIPIDEMIA: ICD-10-CM

## 2018-02-14 DIAGNOSIS — I73.9 PAD (PERIPHERAL ARTERY DISEASE) (HCC): ICD-10-CM

## 2018-02-14 DIAGNOSIS — E10.9 TYPE 1 DIABETES MELLITUS WITHOUT COMPLICATION (HCC): ICD-10-CM

## 2018-02-14 DIAGNOSIS — Z96.41 INSULIN PUMP STATUS: ICD-10-CM

## 2018-02-14 DIAGNOSIS — E11.8 TYPE 2 DIABETES MELLITUS WITH COMPLICATION, UNSPECIFIED LONG TERM INSULIN USE STATUS: Primary | ICD-10-CM

## 2018-02-14 LAB
GLUCOSE POC: 192 MG/DL
HBA1C MFR BLD HPLC: 9 %

## 2018-02-14 RX ORDER — MULTIVIT WITH MINERALS/HERBS
1 TABLET ORAL DAILY
COMMUNITY
End: 2020-01-01

## 2018-02-14 RX ORDER — METFORMIN HYDROCHLORIDE 1000 MG/1
1000 TABLET ORAL 2 TIMES DAILY WITH MEALS
Qty: 60 TAB | Refills: 6 | Status: SHIPPED | OUTPATIENT
Start: 2018-02-14 | End: 2018-05-22 | Stop reason: SDUPTHER

## 2018-02-14 RX ORDER — LOSARTAN POTASSIUM 25 MG/1
25 TABLET ORAL DAILY
Qty: 90 TAB | Refills: 6 | Status: SHIPPED | OUTPATIENT
Start: 2018-02-14 | End: 2018-05-22 | Stop reason: SDUPTHER

## 2018-02-14 RX ORDER — GABAPENTIN 300 MG/1
CAPSULE ORAL
Qty: 90 CAP | Refills: 6 | Status: SHIPPED | OUTPATIENT
Start: 2018-02-14 | End: 2018-05-22 | Stop reason: SDUPTHER

## 2018-02-14 RX ORDER — BIOTIN 10 MG
1 TABLET ORAL DAILY
COMMUNITY

## 2018-02-14 NOTE — PROGRESS NOTES
Wt Readings from Last 3 Encounters:   02/14/18 133 lb 4.8 oz (60.5 kg)   05/15/17 140 lb (63.5 kg)   02/16/17 147 lb (66.7 kg)     Temp Readings from Last 3 Encounters:   02/14/18 98 °F (36.7 °C) (Oral)   05/15/17 96.6 °F (35.9 °C) (Oral)   02/16/17 98.6 °F (37 °C) (Oral)     BP Readings from Last 3 Encounters:   02/14/18 168/81   05/15/17 146/72   02/16/17 157/59     Pulse Readings from Last 3 Encounters:   02/14/18 86   05/15/17 70   02/16/17 66     Lab Results   Component Value Date/Time    Hemoglobin A1c 7.7 (H) 05/18/2015 09:52 AM    Hemoglobin A1c (POC) 7.6 05/15/2017 11:27 AM

## 2018-02-14 NOTE — PATIENT INSTRUCTIONS
Pump -- apidra 2 vials a month ( used 50 units a day )      Continue  neurontin 300 mg AM,    and  600 mg at bed time         Check blood sugars immediately before each meal and at bedtime       Continue metformin     jardiance 25 mg a day before b-fast           BACK UP :     Take Levemir insulin 20 units at bed time       Take Apidra  insulin 3 units before breakfast, 3 units before lunch and 5 units before dinner.     Also, add additional Apidra    as follows with meals  If blood sugars are[de-identified]    150-200 mg 1 units    201-250 mg 2 units    251-300 mg 3 units    301-350 mg 4 units    351-400 mg 5 units    401-450 mg 6 units    451-500 mg 7 units     Less than 70 mg NO INSULIN        No grapes, oranges, peaches and pineapple   Low on starches-- carbs, pasta rice and potatoes   No snacks in between meal times

## 2018-02-14 NOTE — PROGRESS NOTES
HISTORY OF PRESENT ILLNESS     Heidi Goodwin is a 58 y.o. female. HPI   Patient is here for f/u visit of Type 2 diabetes mellitus after May 2017       She got started on insulin pump May 28 2015  Lost  7  lbs     She had hammer toe surgery  ( great toe )  On right side , a month ago   Slow healing     She developed a blister on right lateral aspect, got worried about it   accompanied by /friend        Old history     She saw the vascular surgeon, Dr. Jacquie Hughes,  She has clogged artery on right leg     Going for stent put in    She is doing better with checks   She is smoking    She got off tegretol   She is f/u Dr Laurita Perkins    She has good log and not many low sugars       Prior history . H/o DM 2 for 17 years   Current A1C is 12 % and symptoms/problems include fluctuating blood sugars   Current diabetic medications include Lantus 15 units and glucovance   Current monitoring regimen: home blood tests - 2 times daily   Home blood sugar records: trend: fluctuating a lot   Any episodes of hypoglycemia? no         Review of Systems   Constitutional: Negative. HENT: Negative. Eyes: Negative for pain and redness. Respiratory: Negative. Cardiovascular: Negative for chest pain, palpitations and leg swelling. Gastrointestinal: Negative. Negative for constipation. Genitourinary: Negative. Musculoskeletal: Negative for myalgias. Skin: Negative. Neurological: Negative. Endo/Heme/Allergies: Negative. Psychiatric/Behavioral: Negative for depression and memory loss. The patient does not have insomnia. Physical Exam   Constitutional: She is oriented to person, place, and time. She appears well-developed and well-nourished. HENT:   Head: Normocephalic. Eyes: Conjunctivae and EOM are normal. Pupils are equal, round, and reactive to light. Neck: Normal range of motion. Neck supple. No JVD present. No tracheal deviation present. No thyromegaly present.    Cardiovascular: Normal rate, regular rhythm and normal heart sounds. No murmur heard. Pulmonary/Chest: Breath sounds normal.   Abdominal: Soft. Bowel sounds are normal.   Musculoskeletal: Normal range of motion. Lymphadenopathy:   She has no cervical adenopathy. Neurological: She is alert and oriented to person, place, and time. She has normal reflexes. Skin: Skin is warm. Psychiatric: She has a normal mood and affect. Diabetic foot exam:  Feb 2018   Left: Reflexes nd     Vibratory sensation normal    Proprioception nd   Sharp/dull discrimination nd    Filament test normal sensation with micro filament   Pulse DP: 2+ (normal)   Pulse PT: nd   Deformities: None  Right: Reflexes nd   Vibratory sensation normal   Proprioception nd   Sharp/dull discrimination normal   Filament test normal sensation with micro filament   Pulse DP: 2+ (normal)   Pulse PT: nd   Deformities: great toe- s/p hammer surgery, right lateral aspect- blister is popped, appears hematosed        Lab Results   Component Value Date/Time    Hemoglobin A1c 7.7 (H) 05/18/2015 09:52 AM    Hemoglobin A1c 9.2 (H) 02/03/2015 10:08 AM    Hemoglobin A1c 9.1 (H) 05/01/2014 09:10 AM    Glucose 102 (H) 02/16/2017 04:33 PM    Glucose (POC) 158 (H) 03/15/2013 11:14 AM    Glucose  02/14/2018 12:02 PM    Microalb/Creat ratio (ug/mg creat.) 278.2 (H) 02/16/2017 04:33 PM    LDL, calculated 77 02/16/2017 04:33 PM    Creatinine 0.61 02/16/2017 04:33 PM      Lab Results   Component Value Date/Time    Cholesterol, total 153 02/16/2017 04:33 PM    HDL Cholesterol 55 02/16/2017 04:33 PM    LDL, calculated 77 02/16/2017 04:33 PM    Triglyceride 107 02/16/2017 04:33 PM       Lab Results   Component Value Date/Time    ALT (SGPT) 15 02/16/2017 04:33 PM    AST (SGOT) 19 02/16/2017 04:33 PM    Alk.  phosphatase 120 (H) 02/16/2017 04:33 PM    Bilirubin, total 0.2 02/16/2017 04:33 PM       Lab Results   Component Value Date/Time    GFR est  02/16/2017 04:33 PM    GFR est non-AA 98 02/16/2017 04:33 PM    Creatinine 0.61 02/16/2017 04:33 PM    BUN 11 02/16/2017 04:33 PM    Sodium 142 02/16/2017 04:33 PM    Potassium 5.1 02/16/2017 04:33 PM    Chloride 101 02/16/2017 04:33 PM    CO2 23 02/16/2017 04:33 PM                 ASSESSMENT and PLAN     1. Type 2 DM, un controlled with type 1 behaviour  : A1c is   9 %      From     Feb 2018   Compared to  7.7 %     From    Today May 2017    compared to   8.2 %    From  Feb 2017   Compared to     7.2 %    From today aug 2016  Compared to  7.7 %     From   May 2016  Compared to  6.3 %      From      Sept 2015   Compared to   7.7 %  From  May 2015 ;    9.2 %    From feb 2015  Compared to   9.1 %    From oct 2014  compared to  9.1 % from May 2014 compared to  8. 3 % from feb 2014 compared to 8.8 % June 2013 compared to 7.6 % from aug 2012 ; Compared to  over 12 % from may 2012 to current a1c is 7        Glycemic control is LOST     on the pump - started in may 2015  Increase   checks and intake of insulin, not entering carbs at all ( notieceable again)  She is afraid that she would gain weight if she carbs correctly   Log showed  good sugars       She is taking only 12  units  A day     Continue  invokana -- but its role is questionable and she decided to stay on it   Continue on metformin    LOW c-pep , AMANDA negative      advised about checking blood sugars 4 times a day and maintaining log book. The danger of having low blood sugars has been explained with inappropriate use of insulin Patient voiced understanding and using the printed instructions at home. Hypoglycemia management has been explained to the patient. lab results and schedule of future lab studies reviewed with patient     2. Hypoglycemia : provided the education    3. HTN : on cozaar   , Proteinuria noticeable   Lisinopril stopped for cough      4. Dyslipidemia : continue zocor. Patient is educated about benefits and adverse effects of statins and explained how benefits outweigh risk.      5. use of aspirin to prevent MI and TIA's discussed     6. On estrogen  Requesting refills on diflucan      7. Neuropathy :  She says she has no relief On  gabapentin to 300 AM  and 600 hs ( from 100, 100, 300 mg   Dosing)   Stopped  tramadol 50mg        8. Depression : she is f/u with Dr. Shani Bae   She works at [employer] and she thinks that her depression is much deeper   She is on effexor   Suggesting vibryid       10.    PAD - Right foot recent surgery , she has pain constant in the leg   She is a smoker   Got Checked out vascular status by Dr. Priti Aguillon , had  stent placement   She has the hammer toe surgery a month ago , slow healing , she has hardware in place   Recommending her to f/u with him again         > 50 % visit time spent on counseling   Patient voiced understanding her plan of care

## 2018-02-14 NOTE — MR AVS SNAPSHOT
[address] [id number] 
[phone] Patient: Mariah Bower MRN: [id number] ZSE:[birth year] Visit Information Date & Time Provider Department Dept. Phone Encounter #  
 2/14/2018 11:30 AM Dnony Boyle MD [facility] [phone] [id number] Upcoming Health Maintenance Date Due Hepatitis C Screening 1955 Pneumococcal 19-64 Medium Risk (1 of 1 - PPSV23) 7/11/1974 DTaP/Tdap/Td series (1 - Tdap) 7/11/1976 PAP AKA CERVICAL CYTOLOGY 7/11/1976 BREAST CANCER SCRN MAMMOGRAM 7/11/2005 FOBT Q 1 YEAR AGE 50-75 7/11/2005 ZOSTER VACCINE AGE 60> 5/11/2015 FOOT EXAM Q1 1/22/2016 Influenza Age 5 to Adult 8/1/2017 EYE EXAM RETINAL OR DILATED Q1 9/20/2017 HEMOGLOBIN A1C Q6M 11/15/2017 MICROALBUMIN Q1 2/16/2018 LIPID PANEL Q1 2/16/2018 Allergies as of 2/14/2018  Review Complete On: 2/14/2018 By: Donny Boyle MD  
  
 Severity Noted Reaction Type Reactions Levaquin [Levofloxacin]  10/17/2014    Rash Pcn [Penicillins]  05/07/2012    Hives Robaxin [Methocarbamol]  10/17/2014    Rash Patient not allergic Rocephin [Ceftriaxone]  09/14/2015    Rash Current Immunizations  Never Reviewed No immunizations on file. Not reviewed this visit You Were Diagnosed With   
  
 Codes Comments Type 2 diabetes mellitus with complication, unspecified long term insulin use status (HCC)    -  Primary ICD-10-CM: E11.8 ICD-9-CM: 250.90 Insulin pump status     ICD-10-CM: Z96.41 
ICD-9-CM: V45.85 Vitals BP Pulse Temp Resp Height(growth percentile) Weight(growth percentile) 168/81 (BP 1 Location: Right arm, BP Patient Position: Sitting) 86 98 °F (36.7 °C) (Oral) 18 5' 3\" (1.6 m) 133 lb 4.8 oz (60.5 kg) SpO2 BMI OB Status Smoking Status 98% 23.61 kg/m2 Postmenopausal Current Every Day Smoker Vitals History BMI and BSA Data Body Mass Index Body Surface Area  
 23.61 kg/m 2 1.64 m 2 Preferred Pharmacy Pharmacy Name Phone 99 [facility], [address] María [place] [phone] Your Updated Medication List  
  
   
This list is accurate as of: 2/14/18 12:25 PM.  Always use your most recent med list.  
  
  
  
  
 Wilber Mussel 62.5-25 mcg/actuation inhaler Generic drug:  umeclidinium-vilanterol  
  
 aspirin 81 mg tablet Take 81 mg by mouth daily. b complex vitamins tablet Take 1 Tab by mouth daily. BIOTIN PO Take  by mouth. CALCIUM 600 + D(3) PO Take  by mouth. CRANBERRY FRUIT PO Take  by mouth.  
  
 empagliflozin 25 mg tablet Commonly known as:  Jensen Collar Take 1 Tab by mouth daily. Stop invokana FETZIMA 40 mg ER capsule Generic drug:  levomilnacipran Take  by mouth daily. FISH OIL 1,000 mg Cap Generic drug:  omega-3 fatty acids-vitamin e Take 1 Cap by mouth daily. fluconazole 150 mg tablet Commonly known as:  DIFLUCAN Take 1 tablet by mouth for 2 days  
  
 gabapentin 300 mg capsule Commonly known as:  NEURONTIN  
TAKE 1 CAPSULE BY MOUTH IN THE MORNING AND 2 CAPSULES AT BEDTIME  
  
 * glucose blood VI test strips strip Commonly known as:  ONE TOUCH TEST Test 4 times daily, Dx. Code 250.00  
  
 * ONETOUCH ULTRA TEST strip Generic drug:  glucose blood VI test strips USE TO TEST FOUR TIMES DAILY  
  
 * CONTOUR NEXT STRIPS strip Generic drug:  glucose blood VI test strips USE TO TEST FIVE TIMES DAILY  
  
 insulin aspart 100 unit/mL injection Commonly known as:  Suri Rutledge To use VIA Pump Max daily dose 50 units * insulin detemir 100 unit/mL (3 mL) Inpn Commonly known as:  Isabella Flavors Inject 20 units at bedtime * LEVEMIR FLEXPEN 100 unit/mL (3 mL) Inpn Generic drug:  insulin detemir INJECT 20 UNITS AT BEDTIME  
  
 * insulin glulisine 100 unit/mL pen Commonly known as:  Rendall Hang Inject 3 units before breakfast, 3 units before lunch, and 5 units before dinner. Plus sliding scale * insulin glulisine 100 unit/mL injection Commonly known as:  APIDRA To use in Pump max daily units 50 * insulin glulisine 100 unit/mL injection Commonly known as:  APIDRA  
USE WITH PUMP; MAXIMUM OF 50 UNITS DAILY  
  
 insulin lispro 100 unit/mL injection Commonly known as:  HUMALOG To use via pump. Max daily dose 50 units. Insulin Needles (Disposable) 32 gauge x 1/4\" Ndle Commonly known as:  NOVOFINE 32 Use 4 times daily Iron 325 mg (65 mg iron) tablet Generic drug:  ferrous sulfate Take  by mouth Daily (before breakfast). Lancets Misc Commonly known as: One Touch Margarie Patter Test 4 times daily, Dx. Code 250.00  
  
 losartan 25 mg tablet Commonly known as:  COZAAR Take  by mouth daily. metFORMIN 1,000 mg tablet Commonly known as:  GLUCOPHAGE Take 1 Tab by mouth two (2) times daily (with meals). Stop Glucovance  
  
 omeprazole 40 mg capsule Commonly known as:  PRILOSEC TK 1 C PO QD  
  
 ONE-A-DAY MENOPAUSE HEALTH PO Take 1 Tab by mouth daily. oxyCODONE-acetaminophen 5-325 mg per tablet Commonly known as:  PERCOCET Take 1 Tab by mouth every eight (8) hours as needed for Pain. Max Daily Amount: 3 Tabs. PROAIR HFA 90 mcg/actuation inhaler Generic drug:  albuterol  
  
 simvastatin 10 mg tablet Commonly known as:  ZOCOR  
  
 tiZANidine 4 mg tablet Commonly known as:  ZANAFLEX  
  
 traZODone 100 mg tablet Commonly known as:  Jacinto Bowl Take 100 mg by mouth nightly. * ESTRACE 0.01 % (0.1 mg/gram) vaginal cream  
Generic drug:  estradiol * Matt Abbot Insert  into vagina BID Mon Wed & Fri.  
  
 valACYclovir 500 mg tablet Commonly known as:  VALTREX  
  
 VITAMIN B-12 1,000 mcg tablet Generic drug:  cyanocobalamin Take 5,000 mcg by mouth daily. VITAMIN C 1,000 mg tablet Generic drug:  ascorbic acid (vitamin C) Take  by mouth. VITAMIN D3 1,000 unit tablet Generic drug:  cholecalciferol Take  by mouth daily. * Notice: This list has 10 medication(s) that are the same as other medications prescribed for you. Read the directions carefully, and ask your doctor or other care provider to review them with you. We Performed the Following AMB POC GLUCOSE BLOOD, BY GLUCOSE MONITORING DEVICE [26974 CPT(R)] AMB POC HEMOGLOBIN A1C [90992 CPT(R)] LIPID PANEL [09755 CPT(R)] METABOLIC PANEL, COMPREHENSIVE [13543 CPT(R)] MICROALBUMIN, UR, RAND W/ MICROALBUMIN/CREA RATIO [id number] CPT(R)] Patient Instructions Pump -- apidra 2 vials a month ( used 50 units a day ) Continue  neurontin 300 mg AM,    and  600 mg at bed time Check blood sugars immediately before each meal and at bedtime Continue metformin 
 
 jardiance 25 mg a day before b-fast  
 
 
 
 
BACK UP :  
 
Take Levemir insulin 20 units at bed time Take Apidra  insulin 3 units before breakfast, 3 units before lunch and 5 units before dinner. Also, add additional Apidra    as follows with meals  If blood sugars are[de-identified] 
 
150-200 mg 1 units 201-250 mg 2 units 251-300 mg 3 units 301-350 mg 4 units 351-400 mg 5 units 401-450 mg 6 units 451-500 mg 7 units Less than 70 mg NO INSULIN 
 
 
 
No grapes, oranges, peaches and pineapple Low on starches-- carbs, pasta rice and potatoes No snacks in between meal times Introducing [facility] HEALTH [facility]! Dear Saint Bakes: 
Thank you for requesting a [employer] account. Our records indicate that you already have an active [employer] account. You can access your account anytime at [email or web address] Did you know that you can access your hospital and ER discharge instructions at any time in Your Tribute? You can also review all of your test results from your hospital stay or ER visit. Additional Information If you have questions, please visit the Frequently Asked Questions section of the Your Tribute website at [email or web address] Remember, Your Tribute is NOT to be used for urgent needs. For medical emergencies, dial 911. Now available from your iPhone and Android! Please provide this summary of care documentation to your next provider. Your primary care clinician is listed as Charan Leblancs. If you have any questions after today's visit, please call [phone].

## 2018-02-18 PROBLEM — Z91.199 NONCOMPLIANCE: Status: ACTIVE | Noted: 2018-02-18

## 2018-02-22 LAB
ALBUMIN SERPL-MCNC: 4 G/DL (ref 3.6–4.8)
ALBUMIN/GLOB SERPL: 1.3 {RATIO} (ref 1.2–2.2)
ALP SERPL-CCNC: 133 IU/L (ref 39–117)
ALT SERPL-CCNC: 11 IU/L (ref 0–32)
AST SERPL-CCNC: 17 IU/L (ref 0–40)
BILIRUB SERPL-MCNC: 0.3 MG/DL (ref 0–1.2)
BUN SERPL-MCNC: 8 MG/DL (ref 8–27)
BUN/CREAT SERPL: 12 (ref 12–28)
CALCIUM SERPL-MCNC: 9.4 MG/DL (ref 8.7–10.3)
CHLORIDE SERPL-SCNC: 98 MMOL/L (ref 96–106)
CHOLEST SERPL-MCNC: 181 MG/DL (ref 100–199)
CO2 SERPL-SCNC: 21 MMOL/L (ref 18–29)
CREAT SERPL-MCNC: 0.65 MG/DL (ref 0.57–1)
CREAT UR-MCNC: NORMAL MG/DL
GFR SERPLBLD CREATININE-BSD FMLA CKD-EPI: 110 ML/MIN/{1.73_M2}
GFR SERPLBLD CREATININE-BSD FMLA CKD-EPI: 95 ML/MIN/{1.73_M2}
GLOBULIN SER CALC-MCNC: 3.2 G/L (ref 1.5–4.5)
GLUCOSE SERPL-MCNC: 181 MG/DL (ref 65–99)
HDLC SERPL-MCNC: 47 MG/DL
INTERPRETATION, 910389: NORMAL
LDLC SERPL CALC-MCNC: 94 MG/DL (ref 0–99)
Lab: NORMAL
MICROALBUMIN UR-MCNC: NORMAL
POTASSIUM SERPL-SCNC: 3.9 MMOL/L (ref 3.5–5.2)
PROT SERPL-MCNC: 7.2 G/DL (ref 6–8.5)
SODIUM SERPL-SCNC: 141 MMOL/L (ref 134–144)
TRIGL SERPL-MCNC: 199 MG/DL (ref 0–149)
VLDLC SERPL CALC-MCNC: 40 MG/DL (ref 5–40)

## 2018-05-22 ENCOUNTER — OFFICE VISIT (OUTPATIENT)
Dept: ENDOCRINOLOGY | Age: 63
End: 2018-05-22

## 2018-05-22 VITALS
TEMPERATURE: 97.4 F | HEART RATE: 96 BPM | DIASTOLIC BLOOD PRESSURE: 77 MMHG | RESPIRATION RATE: 16 BRPM | WEIGHT: 134.6 LBS | SYSTOLIC BLOOD PRESSURE: 150 MMHG | BODY MASS INDEX: 23.85 KG/M2 | HEIGHT: 63 IN | OXYGEN SATURATION: 94 %

## 2018-05-22 DIAGNOSIS — E11.65 UNCONTROLLED TYPE 2 DIABETES MELLITUS WITH HYPERGLYCEMIA, WITH LONG-TERM CURRENT USE OF INSULIN (HCC): Primary | ICD-10-CM

## 2018-05-22 DIAGNOSIS — Z96.41 INSULIN PUMP STATUS: ICD-10-CM

## 2018-05-22 DIAGNOSIS — I10 ESSENTIAL HYPERTENSION: ICD-10-CM

## 2018-05-22 DIAGNOSIS — E10.9 TYPE 1 DIABETES MELLITUS WITHOUT COMPLICATION (HCC): ICD-10-CM

## 2018-05-22 DIAGNOSIS — I73.9 PAD (PERIPHERAL ARTERY DISEASE) (HCC): ICD-10-CM

## 2018-05-22 DIAGNOSIS — E11.65 TYPE 2 DIABETES MELLITUS WITH HYPERGLYCEMIA, WITH LONG-TERM CURRENT USE OF INSULIN (HCC): ICD-10-CM

## 2018-05-22 DIAGNOSIS — E78.2 MIXED HYPERLIPIDEMIA: ICD-10-CM

## 2018-05-22 DIAGNOSIS — Z79.4 ENCOUNTER FOR LONG-TERM (CURRENT) USE OF INSULIN (HCC): ICD-10-CM

## 2018-05-22 DIAGNOSIS — Z79.4 TYPE 2 DIABETES MELLITUS WITH HYPERGLYCEMIA, WITH LONG-TERM CURRENT USE OF INSULIN (HCC): ICD-10-CM

## 2018-05-22 DIAGNOSIS — Z79.4 UNCONTROLLED TYPE 2 DIABETES MELLITUS WITH HYPERGLYCEMIA, WITH LONG-TERM CURRENT USE OF INSULIN (HCC): Primary | ICD-10-CM

## 2018-05-22 LAB — HBA1C MFR BLD HPLC: 8.2 %

## 2018-05-22 RX ORDER — METFORMIN HYDROCHLORIDE 1000 MG/1
1000 TABLET ORAL 2 TIMES DAILY WITH MEALS
Qty: 60 TAB | Refills: 6 | Status: SHIPPED | OUTPATIENT
Start: 2018-05-22 | End: 2019-07-02 | Stop reason: SDUPTHER

## 2018-05-22 RX ORDER — GABAPENTIN 300 MG/1
CAPSULE ORAL
Qty: 90 CAP | Refills: 6 | Status: SHIPPED | OUTPATIENT
Start: 2018-05-22 | End: 2019-07-02 | Stop reason: SDUPTHER

## 2018-05-22 NOTE — PATIENT INSTRUCTIONS
Freestyle Pretty  System               Pump -- apidra 2 vials a month ( used 50 units a day )      Continue  neurontin 300 mg AM,    and  600 mg at bed time         Check blood sugars immediately before each meal and at bedtime       Continue metformin     jardiance 25 mg a day before b-fast           BACK UP :     Take Levemir insulin 20 units at bed time       Take Apidra  insulin 3 units before breakfast, 3 units before lunch and 5 units before dinner.     Also, add additional Apidra    as follows with meals  If blood sugars are[de-identified]    150-200 mg 1 units    201-250 mg 2 units    251-300 mg 3 units    301-350 mg 4 units    351-400 mg 5 units    401-450 mg 6 units    451-500 mg 7 units     Less than 70 mg NO INSULIN        No grapes, oranges, peaches and pineapple   Low on starches-- carbs, pasta rice and potatoes   No snacks in between meal times

## 2018-05-22 NOTE — PROGRESS NOTES
Chief Complaint   Patient presents with    Diabetes     1. Have you been to the ER, urgent care clinic since your last visit? Hospitalized since your last visit? NO    2. Have you seen or consulted any other health care providers outside of the [facility] since your last visit? Include any pap smears or colon screening. YES PCP Dr. Yovany Madrid 2/2017 2/2017.   Eye Exam 12/5/2017  Foot Exam, 2/18/2018  Wt Readings from Last 3 Encounters:   05/22/18 134 lb 9.6 oz (61.1 kg)   02/14/18 133 lb 4.8 oz (60.5 kg)   05/15/17 140 lb (63.5 kg)     Temp Readings from Last 3 Encounters:   05/22/18 97.4 °F (36.3 °C) (Oral)   02/14/18 98 °F (36.7 °C) (Oral)   05/15/17 96.6 °F (35.9 °C) (Oral)     BP Readings from Last 3 Encounters:   05/22/18 150/77   02/14/18 168/81   05/15/17 146/72     Pulse Readings from Last 3 Encounters:   05/22/18 96   02/14/18 86   05/15/17 70

## 2018-05-22 NOTE — PROGRESS NOTES
HISTORY OF PRESENT ILLNESS     Sudhir Harper is a 58 y.o. female. HPI   Patient is here for f/u visit of Type 2 diabetes mellitus after  Feb 2018   She got started on insulin pump May 28 2015    Gained 1 lbs           Old history   Lost  7  lbs     She had hammer toe surgery  ( great toe )  On right side , a month ago   Slow healing     She developed a blister on right lateral aspect, got worried about it   accompanied by /friend        Old history     She saw the vascular surgeon, Dr. Angela Norwood,  She has clogged artery on right leg     Going for stent put in    She is doing better with checks   She is smoking    She got off tegretol   She is f/u Dr Ward Torres    She has good log and not many low sugars       Prior history . H/o DM 2 for 17 years   Current A1C is 12 % and symptoms/problems include fluctuating blood sugars   Current diabetic medications include Lantus 15 units and glucovance   Current monitoring regimen: home blood tests - 2 times daily   Home blood sugar records: trend: fluctuating a lot   Any episodes of hypoglycemia? no         Review of Systems   Constitutional: Negative. HENT: Negative. Eyes: Negative for pain and redness. Respiratory: Negative. Cardiovascular: Negative for chest pain, palpitations and leg swelling. Gastrointestinal: Negative. Negative for constipation. Genitourinary: Negative. Musculoskeletal: Negative for myalgias. Skin: Negative. Neurological: Negative. Endo/Heme/Allergies: Negative. Psychiatric/Behavioral: Negative for depression and memory loss. The patient does not have insomnia. Physical Exam   Constitutional: She is oriented to person, place, and time. She appears well-developed and well-nourished. HENT:   Head: Normocephalic. Eyes: Conjunctivae and EOM are normal. Pupils are equal, round, and reactive to light. Neck: Normal range of motion. Neck supple. No JVD present. No tracheal deviation present. No thyromegaly present. Cardiovascular: Normal rate, regular rhythm and normal heart sounds. No murmur heard. Pulmonary/Chest: Breath sounds normal.   Abdominal: Soft. Bowel sounds are normal.   Musculoskeletal: Normal range of motion. Lymphadenopathy:   She has no cervical adenopathy. Neurological: She is alert and oriented to person, place, and time. She has normal reflexes. Skin: Skin is warm. Psychiatric: She has a normal mood and affect. Diabetic foot exam:  Feb 2018   Left: Reflexes nd     Vibratory sensation normal    Proprioception nd   Sharp/dull discrimination nd    Filament test normal sensation with micro filament   Pulse DP: 2+ (normal)   Pulse PT: nd   Deformities: None  Right: Reflexes nd   Vibratory sensation normal   Proprioception nd   Sharp/dull discrimination normal   Filament test normal sensation with micro filament   Pulse DP: 2+ (normal)   Pulse PT: nd   Deformities: great toe- s/p hammer surgery, right lateral aspect- blister is popped, appears hematosed                 ASSESSMENT and PLAN     1. Type 2 DM, un controlled with type 1 behaviour  : A1c is  8.2 %    From  May 2018    Compared to      9 %      From     Feb 2018   Compared to  7.7 %     From    Today May 2017    compared to   8.2 %    From  Feb 2017   Compared to     7.2 %    From today aug 2016  Compared to  7.7 %     From   May 2016  Compared to  6.3 %      From      Sept 2015   Compared to   7.7 %  From  May 2015 ;    9.2 %    From feb 2015  Compared to   9.1 %    From oct 2014  compared to  9.1 % from May 2014 compared to  8. 3 % from feb 2014 compared to 8.8 % June 2013 compared to 7.6 % from aug 2012 ; Compared to  over 12 % from may 2012 to current a1c is 7        Glycemic control is  Some better   on the pump - started in may 2015  Increase   checks and intake of insulin, not entering carbs at all ( notieceable again)  She is afraid that she would gain weight if she carbs correctly   Log showed  good sugars       She is taking only 12  units  A day     Continue  invokana -- but its role is questionable and she decided to stay on it   Continue on metformin    LOW c-pep , AMANDA negative      advised about checking blood sugars 4 times a day and maintaining log book. The danger of having low blood sugars has been explained with inappropriate use of insulin Patient voiced understanding and using the printed instructions at home. Hypoglycemia management has been explained to the patient. lab results and schedule of future lab studies reviewed with patient     2. Hypoglycemia : provided the education    3. HTN : on cozaar   , Proteinuria noticeable   Lisinopril stopped for cough      4. Dyslipidemia : continue zocor. Patient is educated about benefits and adverse effects of statins and explained how benefits outweigh risk. 5. use of aspirin to prevent MI and TIA's discussed     6. On estrogen  Requesting refills on diflucan      7. Neuropathy :  She says she has no relief On  gabapentin to 300 AM  and 600 hs ( from 100, 100, 300 mg   Dosing)   Stopped  tramadol 50mg        8. Depression : she is f/u with Dr. Yolanda Del Castillo   She works at [employer] and she thinks that her depression is much deeper   She is on effexor   Suggesting vibryid       10.    PAD - Right foot recent surgery , she has pain constant in the leg   She is a smoker   Got Checked out vascular status by Dr. Alen Osullivan , she had angioplasty   She has the hammer toe surgery a month ago , slow healing , she has hardware in place            > 50 % visit time spent on counseling   Patient voiced understanding her plan of care

## 2018-05-22 NOTE — MR AVS SNAPSHOT
[address] [address] [address] [facility] [address] 
[phone] Patient: Jaxon Dominique MRN: [id number] HKM:[birth year] Visit Information Date & Time Provider Department Dept. Phone Encounter #  
 5/22/2018  3:45 PM Renetta Sanders MD [facility] [phone] [id number] Follow-up Instructions Return in about 3 months (around 8/22/2018). Upcoming Health Maintenance Date Due Hepatitis C Screening 1955 Pneumococcal 19-64 Medium Risk (1 of 1 - PPSV23) 7/11/1974 DTaP/Tdap/Td series (1 - Tdap) 7/11/1976 PAP AKA CERVICAL CYTOLOGY 7/11/1976 BREAST CANCER SCRN MAMMOGRAM 7/11/2005 FOBT Q 1 YEAR AGE 50-75 7/11/2005 ZOSTER VACCINE AGE 60> 5/11/2015 Influenza Age 5 to Adult 8/1/2018 HEMOGLOBIN A1C Q6M 8/14/2018 EYE EXAM RETINAL OR DILATED Q1 12/5/2018 MICROALBUMIN Q1 2/14/2019 LIPID PANEL Q1 2/14/2019 FOOT EXAM Q1 2/18/2019 Allergies as of 5/22/2018  Review Complete On: 5/22/2018 By: Renetta Sanders MD  
  
 Severity Noted Reaction Type Reactions Levaquin [Levofloxacin]  10/17/2014    Rash Pcn [Penicillins]  05/07/2012    Hives Robaxin [Methocarbamol]  10/17/2014    Rash Patient not allergic Rocephin [Ceftriaxone]  09/14/2015    Rash Current Immunizations  Never Reviewed No immunizations on file. Not reviewed this visit You Were Diagnosed With   
  
 Codes Comments Insulin pump status    -  Primary ICD-10-CM: Z96.41 
ICD-9-CM: V45.85 Encounter for long-term (current) use of insulin ([facility] [address]     ICD-10-CM: Z79.4 ICD-9-CM: V58.67 Uncontrolled type 2 diabetes mellitus with hyperglycemia, with long-term current use of insulin (HCC)     ICD-10-CM: E11.65, Z79.4 ICD-9-CM: 250.02, V58.67 Mixed hyperlipidemia     ICD-10-CM: E78.2 ICD-9-CM: 272.2 Essential hypertension     ICD-10-CM: I10 
ICD-9-CM: 401.9 Vitals BP Pulse Temp Resp Height(growth percentile) Weight(growth percentile) 150/77 (BP 1 Location: Right arm, BP Patient Position: Sitting) 96 97.4 °F (36.3 °C) (Oral) 16 5' 3\" (1.6 m) 134 lb 9.6 oz (61.1 kg) SpO2 BMI OB Status Smoking Status 94% 23.84 kg/m2 Postmenopausal Current Every Day Smoker Vitals History BMI and BSA Data Body Mass Index Body Surface Area  
 23.84 kg/m 2 1.65 m 2 Preferred Pharmacy Pharmacy Name Phone 99 [facility], [address] [place] [address] [place] [phone] Your Updated Medication List  
  
   
This list is accurate as of 5/22/18  4:37 PM.  Always use your most recent med list.  
  
  
  
  
 ANORO ELLIPTA 62.5-25 mcg/actuation inhaler Generic drug:  umeclidinium-vilanterol  
  
 aspirin 81 mg tablet Take 81 mg by mouth daily. b complex vitamins tablet Take 1 Tab by mouth daily. BIOTIN PO Take  by mouth. CALCIUM 600 + D(3) PO Take  by mouth. CRANBERRY FRUIT PO Take  by mouth.  
  
 empagliflozin 25 mg tablet Commonly known as:  Cory Nunez Take 1 Tab by mouth daily. Stop invokana FETZIMA 40 mg ER capsule Generic drug:  levomilnacipran Take  by mouth daily. FISH OIL 1,000 mg Cap Generic drug:  omega-3 fatty acids-vitamin e Take 1 Cap by mouth daily. gabapentin 300 mg capsule Commonly known as:  NEURONTIN  
TAKE 1 CAPSULE BY MOUTH IN THE MORNING AND 2 CAPSULES AT BEDTIME  
  
 * glucose blood VI test strips strip Commonly known as:  ONE TOUCH TEST Test 4 times daily, Dx. Code 250.00  
  
 * ONETOUCH ULTRA TEST strip Generic drug:  glucose blood VI test strips USE TO TEST FOUR TIMES DAILY  
  
 * CONTOUR NEXT TEST STRIPS strip Generic drug:  glucose blood VI test strips USE TO TEST FIVE TIMES DAILY  
  
 insulin aspart U-100 100 unit/mL injection Commonly known as:  NovoLOG U-100 Insulin aspart To use VIA Pump Max daily dose 50 units * insulin detemir U-100 100 unit/mL (3 mL) Inpn Commonly known as:  Trevin Block Inject 20 units at bedtime * LEVEMIR FLEXPEN 100 unit/mL (3 mL) Inpn Generic drug:  insulin detemir U-100 INJECT 20 UNITS AT BEDTIME  
  
 * insulin glulisine U-100 100 unit/mL pen Commonly known as:  APIDRA SOLOSTAR U-100 INSULIN Inject 3 units before breakfast, 3 units before lunch, and 5 units before dinner. Plus sliding scale * insulin glulisine U-100 100 unit/mL injection Commonly known as:  APIDRA U-100 INSULIN To use in Pump max daily units 50 * insulin glulisine U-100 100 unit/mL injection Commonly known as:  APIDRA U-100 INSULIN  
USE WITH PUMP; MAXIMUM OF 50 UNITS DAILY  
  
 insulin lispro 100 unit/mL injection Commonly known as:  HUMALOG To use via pump. Max daily dose 50 units. Insulin Needles (Disposable) 32 gauge x 1/4\" Ndle Commonly known as:  NOVOFINE 32 Use 4 times daily Iron 325 mg (65 mg iron) tablet Generic drug:  ferrous sulfate Take  by mouth Daily (before breakfast). Lancets Misc Commonly known as: One Touch Surya Severance Test 4 times daily, Dx. Code 250.00  
  
 losartan 25 mg tablet Commonly known as:  COZAAR Take  by mouth daily. metFORMIN 1,000 mg tablet Commonly known as:  GLUCOPHAGE Take 1 Tab by mouth two (2) times daily (with meals). Stop Glucovance  
  
 omeprazole 40 mg capsule Commonly known as:  PRILOSEC TK 1 C PO QD  
  
 ONE-A-DAY MENOPAUSE HEALTH PO Take 1 Tab by mouth daily. oxyCODONE-acetaminophen 5-325 mg per tablet Commonly known as:  PERCOCET Take 1 Tab by mouth every eight (8) hours as needed for Pain. Max Daily Amount: 3 Tabs. PROAIR HFA 90 mcg/actuation inhaler Generic drug:  albuterol  
  
 simvastatin 10 mg tablet Commonly known as:  ZOCOR  
  
 tiZANidine 4 mg tablet Commonly known as:  ZANAFLEX  
  
 traZODone 100 mg tablet Commonly known as:  Kathleenessie Webb Take 100 mg by mouth nightly. * ESTRACE 0.01 % (0.1 mg/gram) vaginal cream  
Generic drug:  estradiol * Valora Jake Insert  into vagina BID Mon Wed & Fri.  
  
 valACYclovir 500 mg tablet Commonly known as:  VALTREX  
  
 VITAMIN B-12 1,000 mcg tablet Generic drug:  cyanocobalamin Take 5,000 mcg by mouth daily. VITAMIN C 1,000 mg tablet Generic drug:  ascorbic acid (vitamin C) Take  by mouth. VITAMIN D3 1,000 unit tablet Generic drug:  cholecalciferol Take  by mouth daily. * Notice: This list has 10 medication(s) that are the same as other medications prescribed for you. Read the directions carefully, and ask your doctor or other care provider to review them with you. Follow-up Instructions Return in about 3 months (around 8/22/2018). Patient Instructions [address] Pump -- apidra 2 vials a month ( used 50 units a day ) Continue  neurontin 300 mg AM,    and  600 mg at bed time Check blood sugars immediately before each meal and at bedtime Continue metformin 
 
 jardiance 25 mg a day before b-fast  
 
 
 
 
BACK UP :  
 
Take Levemir insulin 20 units at bed time Take Apidra  insulin 3 units before breakfast, 3 units before lunch and 5 units before dinner. Also, add additional Apidra    as follows with meals  If blood sugars are[de-identified] 
 
150-200 mg 1 units 201-250 mg 2 units 251-300 mg 3 units 301-350 mg 4 units 351-400 mg 5 units 401-450 mg 6 units 451-500 mg 7 units Less than 70 mg NO INSULIN 
 
 
 
No grapes, oranges, peaches and pineapple Low on starches-- carbs, pasta rice and potatoes No snacks in between meal times Introducing [facility] & HEALTH SERVICES! Dear Scooby Gallagher: 
Thank you for requesting a [employer] account. Our records indicate that you already have an active [employer] account.   You can access your account anytime at [email or web address] Did you know that you can access your hospital and ER discharge instructions at any time in [employer]? You can also review all of your test results from your hospital stay or ER visit. Additional Information If you have questions, please visit the Frequently Asked Questions section of the [employer] website at [email or web address] Remember, [employer] is NOT to be used for urgent needs. For medical emergencies, dial 911. Now available from your iPhone and Android! Please provide this summary of care documentation to your next provider. Your primary care clinician is listed as Karina Mei. If you have any questions after today's visit, please call [phone].

## 2018-06-28 ENCOUNTER — ED HISTORICAL/CONVERTED ENCOUNTER (OUTPATIENT)
Dept: OTHER | Age: 63
End: 2018-06-28

## 2018-11-02 LAB
ALBUMIN SERPL-MCNC: 4.1 G/DL (ref 3.6–4.8)
ALBUMIN/GLOB SERPL: 1.3 {RATIO} (ref 1.2–2.2)
ALP SERPL-CCNC: 117 IU/L (ref 39–117)
ALT SERPL-CCNC: 13 IU/L (ref 0–32)
AST SERPL-CCNC: 19 IU/L (ref 0–40)
BILIRUB SERPL-MCNC: 0.2 MG/DL (ref 0–1.2)
BUN SERPL-MCNC: 9 MG/DL (ref 8–27)
BUN/CREAT SERPL: 12 (ref 12–28)
C PEPTIDE SERPL-MCNC: 2.8 NG/ML (ref 1.1–4.4)
CALCIUM SERPL-MCNC: 9.5 MG/DL (ref 8.7–10.3)
CHLORIDE SERPL-SCNC: 101 MMOL/L (ref 96–106)
CHOLEST SERPL-MCNC: 170 MG/DL (ref 100–199)
CO2 SERPL-SCNC: 23 MMOL/L (ref 20–29)
CREAT SERPL-MCNC: 0.77 MG/DL (ref 0.57–1)
CREAT UR-MCNC: NORMAL MG/DL
EST. AVERAGE GLUCOSE BLD GHB EST-MCNC: 177 MG/DL
GLOBULIN SER CALC-MCNC: 3.1 G/DL (ref 1.5–4.5)
GLUCOSE SERPL-MCNC: 245 MG/DL (ref 65–99)
HBA1C MFR BLD: 7.8 % (ref 4.8–5.6)
HDLC SERPL-MCNC: 52 MG/DL
INTERPRETATION, 910389: NORMAL
LDLC SERPL CALC-MCNC: 96 MG/DL (ref 0–99)
Lab: NORMAL
MICROALBUMIN UR-MCNC: NORMAL
POTASSIUM SERPL-SCNC: 3.6 MMOL/L (ref 3.5–5.2)
PROT SERPL-MCNC: 7.2 G/DL (ref 6–8.5)
SODIUM SERPL-SCNC: 142 MMOL/L (ref 134–144)
TRIGL SERPL-MCNC: 111 MG/DL (ref 0–149)
VLDLC SERPL CALC-MCNC: 22 MG/DL (ref 5–40)

## 2018-11-06 ENCOUNTER — OFFICE VISIT (OUTPATIENT)
Dept: ENDOCRINOLOGY | Age: 63
End: 2018-11-06

## 2018-11-06 VITALS
BODY MASS INDEX: 23.53 KG/M2 | WEIGHT: 132.8 LBS | TEMPERATURE: 97.9 F | RESPIRATION RATE: 16 BRPM | SYSTOLIC BLOOD PRESSURE: 133 MMHG | HEIGHT: 63 IN | DIASTOLIC BLOOD PRESSURE: 58 MMHG | OXYGEN SATURATION: 99 % | HEART RATE: 64 BPM

## 2018-11-06 DIAGNOSIS — I73.9 PAD (PERIPHERAL ARTERY DISEASE) (HCC): ICD-10-CM

## 2018-11-06 DIAGNOSIS — D64.9 ANEMIA, UNSPECIFIED TYPE: ICD-10-CM

## 2018-11-06 DIAGNOSIS — Z13.29 THYROID DISORDER SCREENING: ICD-10-CM

## 2018-11-06 DIAGNOSIS — E11.65 UNCONTROLLED TYPE 2 DIABETES MELLITUS WITH HYPERGLYCEMIA (HCC): Primary | ICD-10-CM

## 2018-11-06 DIAGNOSIS — I10 ESSENTIAL HYPERTENSION: ICD-10-CM

## 2018-11-06 RX ORDER — METOPROLOL TARTRATE AND HYDROCHLOROTHIAZIDE 50; 25 MG/1; MG/1
1 TABLET ORAL DAILY
COMMUNITY
End: 2020-01-01

## 2018-11-06 RX ORDER — CLOPIDOGREL BISULFATE 75 MG/1
75 TABLET ORAL DAILY
COMMUNITY

## 2018-11-06 NOTE — PROGRESS NOTES
HISTORY OF PRESENT ILLNESS     Raquel Teran is a 61 y.o. female. HPI   Patient is here for f/u visit of Type 2 diabetes mellitus after  May  2018   She got started on insulin pump May 28 2015      She got Carmita Beltran     She thought that her blood sugars will get transferred to  The pump automatically     So her download has no blood sugar checks             Old history   Lost  7  lbs     She had hammer toe surgery  ( great toe )  On right side , a month ago   Slow healing     She developed a blister on right lateral aspect, got worried about it   accompanied by /friend        Old history     She saw the vascular surgeon, Dr. Annabella Michel,  She has clogged artery on right leg     Going for stent put in    She is doing better with checks   She is smoking    She got off tegretol   She is f/u Dr Tariq Fraser    She has good log and not many low sugars       Prior history . H/o DM 2 for 17 years   Current A1C is 12 % and symptoms/problems include fluctuating blood sugars   Current diabetic medications include Lantus 15 units and glucovance   Current monitoring regimen: home blood tests - 2 times daily   Home blood sugar records: trend: fluctuating a lot   Any episodes of hypoglycemia? no         Review of Systems   Constitutional: Negative. HENT: Negative. Eyes: Negative for pain and redness. Respiratory: Negative. Cardiovascular: Negative for chest pain, palpitations and leg swelling. Gastrointestinal: Negative. Negative for constipation. Genitourinary: Negative. Musculoskeletal: Negative for myalgias. Skin: Negative. Neurological: Negative. Endo/Heme/Allergies: Negative. Psychiatric/Behavioral: Negative for depression and memory loss. The patient does not have insomnia. Physical Exam   Constitutional: She is oriented to person, place, and time. She appears well-developed and well-nourished. HENT:   Head: Normocephalic.    Eyes: Conjunctivae and EOM are normal. Pupils are equal, round, and reactive to light. Neck: Normal range of motion. Neck supple. No JVD present. No tracheal deviation present. No thyromegaly present. Cardiovascular: Normal rate, regular rhythm and normal heart sounds. No murmur heard. Pulmonary/Chest: Breath sounds normal.   Abdominal: Soft. Bowel sounds are normal.   Musculoskeletal: Normal range of motion. Lymphadenopathy:   She has no cervical adenopathy. Neurological: She is alert and oriented to person, place, and time. She has normal reflexes. Skin: Skin is warm. Psychiatric: She has a normal mood and affect. Diabetic foot exam:  Feb 2018   Left: Reflexes nd     Vibratory sensation normal    Proprioception nd   Sharp/dull discrimination nd    Filament test normal sensation with micro filament   Pulse DP: 2+ (normal)   Pulse PT: nd   Deformities: None  Right: Reflexes nd   Vibratory sensation normal   Proprioception nd   Sharp/dull discrimination normal   Filament test normal sensation with micro filament   Pulse DP: 2+ (normal)   Pulse PT: nd   Deformities: great toe- s/p hammer surgery, healed               ASSESSMENT and PLAN     1. Type 2 DM, un controlled with type 1 behaviour  : A1c is   7.8 %   From oct 2018       Compared to   8.2 %    From  May 2018    Compared to      9 %      From     Feb 2018   Compared to  7.7 %     From    Today May 2017    compared to   8.2 %    From  Feb 2017   Compared to     7.2 %    From today aug 2016  Compared to  7.7 %     From   May 2016  Compared to  6.3 %      From      Sept 2015   Compared to   7.7 %  From  May 2015 ;    9.2 %    From feb 2015  Compared to   9.1 %    From oct 2014  compared to  9.1 % from May 2014 compared to  8. 3 % from feb 2014 compared to 8.8 % June 2013 compared to 7.6 % from aug 2012 ; Compared to  over 12 % from may 2012 to current a1c is 7        Glycemic control is  Some better   on the pump - started in may 2015    She is told to document the numbers on pump from [place]    And she should be able to control easily    Increase   checks and intake of insulin, not entering carbs at all ( notieceable again)  In the past   She is afraid that she would gain weight if she carbs correctly   Reviewed pump download     She is taking only 12  units  A day   Continue jardiance  -- but its role is questionable and she decided to stay on it   Continue on metformin    Behaves like type 1, but her c-pep is positive AMANDA negative      advised about checking blood sugars 4 times a day and maintaining log book. The danger of having low blood sugars has been explained with inappropriate use of insulin Patient voiced understanding and using the printed instructions at home. Hypoglycemia management has been explained to the patient. lab results and schedule of future lab studies reviewed with patient     2. Hypoglycemia : provided the education    3. HTN : on cozaar   , Proteinuria resolved   Lisinopril stopped for cough      4. Dyslipidemia : continue zocor. Patient is educated about benefits and adverse effects of statins and explained how benefits outweigh risk. 5. use of aspirin to prevent MI and TIA's discussed     6. On estrogen  Requesting refills on diflucan      7. Neuropathy :  She says she has no relief On  gabapentin to 300 AM  and 600 hs ( from 100, 100, 300 mg   Dosing)   Stopped  tramadol 50mg        8. Depression : she is f/u with Dr. Townsend Ormond   She works at [employer] and she thinks that her depression is much deeper   She is on effexor   Suggesting vibryid       10.    PAD - Right great toe  surgery , f/u with Dr. Sapna Mccain    she has pain constant in the leg   She is a smoker   Got Checked out vascular status by Dr. Pablo Busby , she had angioplasty   She has the hammer toe surgery a month ago , slow healing , she has hardware in place            > 50 % visit time spent on counseling   Patient voiced understanding her plan of care

## 2018-11-06 NOTE — PROGRESS NOTES
1. Have you been to the ER, urgent care clinic since your last visit? Hospitalized since your last visit? No    2. Have you seen or consulted any other health care providers outside of the [address] [place] [address] since your last visit? Include any pap smears or colon screening.  No       Lab Results   Component Value Date/Time    Hemoglobin A1c 7.8 (H) 10/31/2018 12:00 AM    Hemoglobin A1c (POC) 8.2 05/22/2018 04:46 PM      Wt Readings from Last 3 Encounters:   11/06/18 132 lb 12.8 oz (60.2 kg)   05/22/18 134 lb 9.6 oz (61.1 kg)   02/14/18 133 lb 4.8 oz (60.5 kg)     Temp Readings from Last 3 Encounters:   11/06/18 97.9 °F (36.6 °C) (Oral)   05/22/18 97.4 °F (36.3 °C) (Oral)   02/14/18 98 °F (36.7 °C) (Oral)     BP Readings from Last 3 Encounters:   11/06/18 133/58   05/22/18 150/77   02/14/18 168/81     Pulse Readings from Last 3 Encounters:   11/06/18 64   05/22/18 96   02/14/18 86

## 2018-11-07 LAB
ALBUMIN/CREAT UR: 79.2 MG/G CREAT (ref 0–30)
CREAT UR-MCNC: 129.1 MG/DL
MICROALBUMIN UR-MCNC: 102.3 UG/ML

## 2019-06-26 DIAGNOSIS — Z96.41 INSULIN PUMP STATUS: ICD-10-CM

## 2019-06-26 DIAGNOSIS — I73.9 PAD (PERIPHERAL ARTERY DISEASE) (HCC): ICD-10-CM

## 2019-06-26 DIAGNOSIS — I10 ESSENTIAL HYPERTENSION: ICD-10-CM

## 2019-06-26 DIAGNOSIS — E78.2 MIXED HYPERLIPIDEMIA: ICD-10-CM

## 2019-06-26 DIAGNOSIS — E10.9 TYPE 1 DIABETES MELLITUS WITHOUT COMPLICATION (HCC): ICD-10-CM

## 2019-06-27 RX ORDER — EMPAGLIFLOZIN 25 MG/1
TABLET, FILM COATED ORAL
Qty: 30 TAB | Refills: 0 | Status: SHIPPED | OUTPATIENT
Start: 2019-06-27 | End: 2019-08-05 | Stop reason: SDUPTHER

## 2019-07-02 ENCOUNTER — OFFICE VISIT (OUTPATIENT)
Dept: ENDOCRINOLOGY | Age: 64
End: 2019-07-02

## 2019-07-02 VITALS
HEIGHT: 63 IN | HEART RATE: 80 BPM | RESPIRATION RATE: 18 BRPM | DIASTOLIC BLOOD PRESSURE: 65 MMHG | TEMPERATURE: 98 F | WEIGHT: 122 LBS | SYSTOLIC BLOOD PRESSURE: 149 MMHG | BODY MASS INDEX: 21.62 KG/M2

## 2019-07-02 DIAGNOSIS — I10 ESSENTIAL HYPERTENSION: ICD-10-CM

## 2019-07-02 DIAGNOSIS — E11.65 TYPE 2 DIABETES MELLITUS WITH HYPERGLYCEMIA, WITH LONG-TERM CURRENT USE OF INSULIN (HCC): ICD-10-CM

## 2019-07-02 DIAGNOSIS — E10.9 TYPE 1 DIABETES MELLITUS WITHOUT COMPLICATION (HCC): Primary | ICD-10-CM

## 2019-07-02 DIAGNOSIS — E07.9 THYROID DISEASE: ICD-10-CM

## 2019-07-02 DIAGNOSIS — E10.9 TYPE 1 DIABETES MELLITUS WITHOUT COMPLICATION (HCC): ICD-10-CM

## 2019-07-02 DIAGNOSIS — Z96.41 INSULIN PUMP STATUS: ICD-10-CM

## 2019-07-02 DIAGNOSIS — E78.2 MIXED HYPERLIPIDEMIA: ICD-10-CM

## 2019-07-02 DIAGNOSIS — I73.9 PAD (PERIPHERAL ARTERY DISEASE) (HCC): ICD-10-CM

## 2019-07-02 DIAGNOSIS — M79.674 PAIN OF TOE OF RIGHT FOOT: ICD-10-CM

## 2019-07-02 DIAGNOSIS — Z79.4 TYPE 2 DIABETES MELLITUS WITH HYPERGLYCEMIA, WITH LONG-TERM CURRENT USE OF INSULIN (HCC): ICD-10-CM

## 2019-07-02 RX ORDER — GABAPENTIN 300 MG/1
CAPSULE ORAL
Qty: 90 CAP | Refills: 6 | Status: SHIPPED | OUTPATIENT
Start: 2019-07-02 | End: 2020-02-19

## 2019-07-02 RX ORDER — METFORMIN HYDROCHLORIDE 1000 MG/1
1000 TABLET ORAL 2 TIMES DAILY WITH MEALS
Qty: 60 TAB | Refills: 6 | Status: SHIPPED | OUTPATIENT
Start: 2019-07-02

## 2019-07-02 NOTE — PROGRESS NOTES
1. Have you been to the ER, urgent care clinic since your last visit? Hospitalized since your last visit? No    2. Have you seen or consulted any other health care providers outside of the [address] [place] [address] since your last visit? Include any pap smears or colon screening. No     Chief Complaint   Patient presents with    Diabetes     follow up visit     Learning assessment complete  Abuse Screening Questionnaire 7/2/2019   Do you ever feel afraid of your partner? N   Are you in a relationship with someone who physically or mentally threatens you? N   Is it safe for you to go home? Y         Wt Readings from Last 3 Encounters:   07/02/19 122 lb (55.3 kg)   11/06/18 132 lb 12.8 oz (60.2 kg)   05/22/18 134 lb 9.6 oz (61.1 kg)     Temp Readings from Last 3 Encounters:   07/02/19 98 °F (36.7 °C) (Oral)   11/06/18 97.9 °F (36.6 °C) (Oral)   05/22/18 97.4 °F (36.3 °C) (Oral)     BP Readings from Last 3 Encounters:   07/02/19 149/65   11/06/18 133/58   05/22/18 150/77     Pulse Readings from Last 3 Encounters:   07/02/19 80   11/06/18 64   05/22/18 96     Lab Results   Component Value Date/Time    Hemoglobin A1c 8.1 (H) 01/30/2019 09:15 AM    Hemoglobin A1c (POC) 8.2 05/22/2018 04:46 PM     Medication reviewed with patient to the best of his ability and nurse document \"not taking\" and/or \"taking\" based on patients response.

## 2019-07-02 NOTE — PROGRESS NOTES
HISTORY OF PRESENT ILLNESS     Nallely Cox is a 61 y.o. female. HPI   Patient is here for f/u visit of Type 2 diabetes mellitus after  November 2018   Missed an appt   She got freestyle  mervin IPRO     Pt expressed concern with  Being jardiance   LOST 10 lbs     She had no labs    C/o right great toe pain 8/10 pain ( screw in it )            Old history :   She got started on insulin pump May 28 2015  She got Crow Mcclendon   She thought that her blood sugars will get transferred to  The pump automatically    So her download has no blood sugar checks             Old history   Lost  7  lbs     She had hammer toe surgery  ( great toe )  On right side , a month ago   Slow healing   She developed a blister on right lateral aspect, got worried about it   accompanied by /friend        Old history     She saw the vascular surgeon, Dr. Ruthie Espinoza,  She has clogged artery on right leg     Going for stent put in    She is doing better with checks   She is smoking    She got off tegretol   She is f/u Dr Soledad Huffman    She has good log and not many low sugars       Prior history . H/o DM 2 for 17 years   Current A1C is 12 % and symptoms/problems include fluctuating blood sugars   Current diabetic medications include Lantus 15 units and glucovance   Current monitoring regimen: home blood tests - 2 times daily   Home blood sugar records: trend: fluctuating a lot   Any episodes of hypoglycemia? no         Review of Systems   Constitutional: Negative. HENT: Negative. Eyes: Negative for pain and redness. Respiratory: Negative. Cardiovascular: Negative for chest pain, palpitations and leg swelling. Gastrointestinal: Negative. Negative for constipation. Genitourinary: Negative. Musculoskeletal: Negative for myalgias. Skin: Negative. Neurological: Negative. Endo/Heme/Allergies: Negative. Psychiatric/Behavioral: Negative for depression and memory loss. The patient does not have insomnia.          Physical Exam   Constitutional: She is oriented to person, place, and time. She appears well-developed and well-nourished. HENT:   Head: Normocephalic. Eyes: Conjunctivae and EOM are normal. Pupils are equal, round, and reactive to light. Neck: Normal range of motion. Neck supple. No JVD present. No tracheal deviation present. No thyromegaly present. Cardiovascular: Normal rate, regular rhythm and normal heart sounds. No murmur heard. Pulmonary/Chest: Breath sounds normal.   Abdominal: Soft. Bowel sounds are normal.   Musculoskeletal: Normal range of motion. Lymphadenopathy:   She has no cervical adenopathy. Neurological: She is alert and oriented to person, place, and time. She has normal reflexes. Skin: Skin is warm. Psychiatric: She has a normal mood and affect. Diabetic foot exam:  Feb 2018   Left: Reflexes nd     Vibratory sensation normal    Proprioception nd   Sharp/dull discrimination nd    Filament test normal sensation with micro filament   Pulse DP: 2+ (normal)   Pulse PT: nd   Deformities: None  Right: Reflexes nd   Vibratory sensation normal   Proprioception nd   Sharp/dull discrimination normal   Filament test normal sensation with micro filament   Pulse DP: 2+ (normal)   Pulse PT: nd   Deformities: great toe- s/p hammer surgery, healed  On skin           No labs done before the visit          ASSESSMENT and PLAN     1.  Type 2 DM, un controlled with type 1 behaviour  : A1c is   ??    7.8 %   From oct 2018       Compared to   8.2 %    From  May 2018    Compared to      9 %      From     Feb 2018   Compared to  7.7 %     From    Today May 2017    compared to   8.2 %    From  Feb 2017   Compared to     7.2 %    From today aug 2016  Compared to  7.7 %     From   May 2016  Compared to  6.3 %      From      Sept 2015   Compared to   7.7 %  From  May 2015 ;    9.2 %    From feb 2015  Compared to   9.1 %    From oct 2014  compared to  9.1 % from May 2014       Glycemic control is unsure as she did not get the labs done   on the pump - started in may 2015, on older version  She is told to document the numbers on pump from [place]    And she should be able to control easily    Increase   checks and intake of insulin, not entering carbs at all ( notieceable again)  In the past   She is afraid that she would gain weight if she carbs correctly   Reviewed pump download     She is taking only 12  units  A day   Continue jardiance  -- but its role is questionable and she decided to stay on it . She enjoyed the weight loss from being on Jardiance and having to use less insulin and even though she is the one who brought up the concern regarding the amputations reported on this class of medications, patient herself again insists that she does not want to go off the medication even though I have strictly advised her not to be on this medication. I have provided the information for amputation possibilities despite which she did not want to agree to stop the Jardiance      Continue on metformin    Behaves like type 1, but her c-pep is positive AMANDA negative      advised about checking blood sugars 4 times a day and maintaining log book. 2. Hypoglycemia : provided the education    3. HTN : on cozaar   , Proteinuria resolved   Lisinopril stopped for cough      4. Dyslipidemia : continue zocor. Patient is educated about benefits and adverse effects of statins and explained how benefits outweigh risk. 5. use of aspirin to prevent MI and TIA's discussed     6. On estrogen  Requesting refills on diflucan      7. Neuropathy :  She says she has no relief On  gabapentin to 300 AM  and 600 hs ( from 100, 100, 300 mg   Dosing)   Gabapentin is considered as a scheduled substance starting July 1 and I am not sure if that is obtainable for the patient  Stopped  tramadol 50mg        8. Depression : she is no longer f/u with Dr. Chente Ortez         10.    PAD - Right great toe  surgery , f/u with Dr. Miguelito Faye    she has pain constant in the leg   She is a smoker   Got Checked out vascular status by Dr. Francisco Chavarria , she had angioplasties  Bilaterally   She has the hammer toe surgery a month ago , slow healing , she has hardware in place       11 .  CAD  :  Pt had stent placed   Pt f/u with Dr. Anmol Marinelli         Given her atherosclerotic status  -   Angioplasties in LE and stent in heart and TOE problem , repaired  And she is in pain   I AM ENCOURAGING HER TO STOP JARDIANCE ( HER  ALSO AGREES ), BUT PT DECLINES IT            > 50 % visit time spent on counseling   Patient voiced understanding her plan of care

## 2019-07-02 NOTE — LETTER
7/2/19 Patient: Karina Post YOB: 1955 Date of Visit: 7/2/2019 Sushila Salinas, [address] [facility] [address] [facility] [address] VIA Facsimile: [phone] Dear Sushila Salinas MD, Thank you for referring Ms. Anna Carvalho to [address] for evaluation. My notes for this consultation are attached. If you have questions, please do not hesitate to call me. I look forward to following your patient along with you. Sincerely, Nicho Munoz MD

## 2019-07-03 LAB
ALBUMIN SERPL-MCNC: 3.9 G/DL (ref 3.6–4.8)
ALBUMIN/CREAT UR: 193.8 MG/G CREAT (ref 0–30)
ALBUMIN/GLOB SERPL: 1.4 {RATIO} (ref 1.2–2.2)
ALP SERPL-CCNC: 118 IU/L (ref 39–117)
ALT SERPL-CCNC: 14 IU/L (ref 0–32)
AST SERPL-CCNC: 19 IU/L (ref 0–40)
BILIRUB SERPL-MCNC: 0.3 MG/DL (ref 0–1.2)
BUN SERPL-MCNC: 8 MG/DL (ref 8–27)
BUN/CREAT SERPL: 12 (ref 12–28)
CALCIUM SERPL-MCNC: 9.9 MG/DL (ref 8.7–10.3)
CHLORIDE SERPL-SCNC: 104 MMOL/L (ref 96–106)
CHOLEST SERPL-MCNC: 171 MG/DL (ref 100–199)
CO2 SERPL-SCNC: 28 MMOL/L (ref 20–29)
CREAT SERPL-MCNC: 0.68 MG/DL (ref 0.57–1)
CREAT UR-MCNC: 86.7 MG/DL
EST. AVERAGE GLUCOSE BLD GHB EST-MCNC: 206 MG/DL
GLOBULIN SER CALC-MCNC: 2.8 G/DL (ref 1.5–4.5)
GLUCOSE SERPL-MCNC: 63 MG/DL (ref 65–99)
HBA1C MFR BLD: 8.8 % (ref 4.8–5.6)
HDLC SERPL-MCNC: 61 MG/DL
INTERPRETATION, 910389: NORMAL
LDLC SERPL CALC-MCNC: 95 MG/DL (ref 0–99)
Lab: NORMAL
MICROALBUMIN UR-MCNC: 168 UG/ML
POTASSIUM SERPL-SCNC: 3.1 MMOL/L (ref 3.5–5.2)
PROT SERPL-MCNC: 6.7 G/DL (ref 6–8.5)
SODIUM SERPL-SCNC: 146 MMOL/L (ref 134–144)
TRIGL SERPL-MCNC: 74 MG/DL (ref 0–149)
TSH SERPL DL<=0.005 MIU/L-ACNC: 0.96 UIU/ML (ref 0.45–4.5)
VLDLC SERPL CALC-MCNC: 15 MG/DL (ref 5–40)

## 2019-07-16 NOTE — PROGRESS NOTES
Patient informed. Patient states that she will eat an extra banana for potassium and she is not experiencing muscle cramps at this time.

## 2019-07-16 NOTE — PROGRESS NOTES
Inform patient that her potassium is slightly low  She can increase the potassium in her diet by eating an extra banana every day  I would like to see her medication bottles at next visit with her  Sometimes muscle cramps can happen with low potassium so if she has them I can prescribe potassium chloride pills at 20 mEq once a day

## 2019-08-12 ENCOUNTER — TELEPHONE (OUTPATIENT)
Dept: ENDOCRINOLOGY | Age: 64
End: 2019-08-12

## 2019-08-12 NOTE — TELEPHONE ENCOUNTER
----- Message from Brady Doll sent at 8/12/2019 10:44 AM EDT -----  Regarding: Dr. Dann Serna  Pt requesting a refill on presctiption: \"Test strips\" for the [facility] pharmacy is on file. Pt best contact number is [phone].

## 2019-10-28 ENCOUNTER — APPOINTMENT (OUTPATIENT)
Dept: ENDOCRINOLOGY | Age: 64
End: 2019-10-28

## 2019-10-28 DIAGNOSIS — E10.9 TYPE 1 DIABETES MELLITUS WITHOUT COMPLICATION (HCC): ICD-10-CM

## 2019-10-28 DIAGNOSIS — E07.9 THYROID DISEASE: ICD-10-CM

## 2019-10-28 DIAGNOSIS — E78.2 MIXED HYPERLIPIDEMIA: ICD-10-CM

## 2019-10-29 LAB
ALBUMIN SERPL-MCNC: 4.1 G/DL (ref 3.6–4.8)
ALBUMIN/CREAT UR: 121 MG/G CREAT (ref 0–30)
ALBUMIN/GLOB SERPL: 1.5 {RATIO} (ref 1.2–2.2)
ALP SERPL-CCNC: 112 IU/L (ref 39–117)
ALT SERPL-CCNC: 36 IU/L (ref 0–32)
AST SERPL-CCNC: 39 IU/L (ref 0–40)
BILIRUB SERPL-MCNC: 0.4 MG/DL (ref 0–1.2)
BUN SERPL-MCNC: 14 MG/DL (ref 8–27)
BUN/CREAT SERPL: 16 (ref 12–28)
CALCIUM SERPL-MCNC: 9.6 MG/DL (ref 8.7–10.3)
CHLORIDE SERPL-SCNC: 103 MMOL/L (ref 96–106)
CHOLEST SERPL-MCNC: 149 MG/DL (ref 100–199)
CO2 SERPL-SCNC: 24 MMOL/L (ref 20–29)
CREAT SERPL-MCNC: 0.89 MG/DL (ref 0.57–1)
CREAT UR-MCNC: 68.5 MG/DL
EST. AVERAGE GLUCOSE BLD GHB EST-MCNC: 209 MG/DL
GLOBULIN SER CALC-MCNC: 2.8 G/DL (ref 1.5–4.5)
GLUCOSE SERPL-MCNC: 169 MG/DL (ref 65–99)
HBA1C MFR BLD: 8.9 % (ref 4.8–5.6)
HDLC SERPL-MCNC: 53 MG/DL
INTERPRETATION, 910389: NORMAL
LDLC SERPL CALC-MCNC: 76 MG/DL (ref 0–99)
Lab: NORMAL
MICROALBUMIN UR-MCNC: 82.9 UG/ML
POTASSIUM SERPL-SCNC: 4.3 MMOL/L (ref 3.5–5.2)
PROT SERPL-MCNC: 6.9 G/DL (ref 6–8.5)
SODIUM SERPL-SCNC: 144 MMOL/L (ref 134–144)
TRIGL SERPL-MCNC: 100 MG/DL (ref 0–149)
TSH SERPL DL<=0.005 MIU/L-ACNC: 0.87 UIU/ML (ref 0.45–4.5)
VLDLC SERPL CALC-MCNC: 20 MG/DL (ref 5–40)

## 2019-11-25 ENCOUNTER — OFFICE VISIT (OUTPATIENT)
Dept: ENDOCRINOLOGY | Age: 64
End: 2019-11-25

## 2019-11-25 VITALS
DIASTOLIC BLOOD PRESSURE: 61 MMHG | SYSTOLIC BLOOD PRESSURE: 144 MMHG | HEIGHT: 63 IN | BODY MASS INDEX: 21.81 KG/M2 | RESPIRATION RATE: 18 BRPM | HEART RATE: 64 BPM | WEIGHT: 123.1 LBS | TEMPERATURE: 97.3 F

## 2019-11-25 DIAGNOSIS — E78.2 MIXED HYPERLIPIDEMIA: ICD-10-CM

## 2019-11-25 DIAGNOSIS — I73.9 PAD (PERIPHERAL ARTERY DISEASE) (HCC): ICD-10-CM

## 2019-11-25 DIAGNOSIS — I10 ESSENTIAL HYPERTENSION: ICD-10-CM

## 2019-11-25 DIAGNOSIS — Z96.41 INSULIN PUMP STATUS: ICD-10-CM

## 2019-11-25 DIAGNOSIS — E10.9 TYPE 1 DIABETES MELLITUS WITHOUT COMPLICATION (HCC): ICD-10-CM

## 2019-11-25 DIAGNOSIS — E10.9 TYPE 1 DIABETES MELLITUS WITHOUT COMPLICATION (HCC): Primary | ICD-10-CM

## 2019-11-25 RX ORDER — CODEINE PHOSPHATE AND GUAIFENESIN 10; 100 MG/5ML; MG/5ML
SOLUTION ORAL
COMMUNITY
End: 2020-01-01

## 2019-11-25 RX ORDER — TRAMADOL HYDROCHLORIDE 50 MG/1
TABLET ORAL
COMMUNITY

## 2019-11-25 RX ORDER — DICLOFENAC SODIUM 75 MG/1
TABLET, DELAYED RELEASE ORAL
COMMUNITY
End: 2020-01-01

## 2019-11-25 RX ORDER — LIDOCAINE 50 MG/G
PATCH TOPICAL
COMMUNITY
Start: 2018-05-24

## 2019-11-25 RX ORDER — NABUMETONE 750 MG/1
TABLET, FILM COATED ORAL
COMMUNITY
End: 2020-01-01

## 2019-11-25 RX ORDER — FLUCONAZOLE 150 MG/1
TABLET ORAL
COMMUNITY

## 2019-11-25 RX ORDER — MECLIZINE HYDROCHLORIDE 25 MG/1
25 TABLET ORAL
COMMUNITY

## 2019-11-25 NOTE — PATIENT INSTRUCTIONS
Pump -- apidra 2 vials a month ( used 50 units a day )      Continue  neurontin 300 mg AM,    and  600 mg at bed time         Check blood sugars immediately before each meal and at bedtime       Pt stopped  metformin  jardiance 25 mg a day before b-fast           BACK UP :     Take Levemir insulin 20 units at bed time       Take Apidra  insulin 3 units before breakfast, 3 units before lunch and 5 units before dinner.     Also, add additional Apidra    as follows with meals  If blood sugars are[de-identified]    150-200 mg 1 units    201-250 mg 2 units    251-300 mg 3 units    301-350 mg 4 units    351-400 mg 5 units    401-450 mg 6 units    451-500 mg 7 units     Less than 70 mg NO INSULIN        No grapes, oranges, peaches and pineapple   Low on starches-- carbs, pasta rice and potatoes   No snacks in between meal times

## 2019-11-25 NOTE — PROGRESS NOTES
HISTORY OF PRESENT ILLNESS     Young Quach is a 59 y.o. female. HPI   Patient is here for f/u visit of Type 2 diabetes mellitus after July 2019     She  Lost sensor  And she is not using the medtronic pump 670 G the right manner   She appears frustrated and angry these times ( noticed)    She wants Ozempic             Old history :    Missed an appt   She got freestyle  mervin IPRO   Pt expressed concern with  Being jardiance   LOST 10 lbs     She had no labs  C/o right great toe pain 8/10 pain ( screw in it )            Old history :   She got started on insulin pump May 28 2015  She got Hilario Garcia   She thought that her blood sugars will get transferred to  The pump automatically    So her download has no blood sugar checks         Old history   Lost  7  lbs   She had hammer toe surgery  ( great toe )  On right side , a month ago   Slow healing   She developed a blister on right lateral aspect, got worried about it   accompanied by /friend        Old history     She saw the vascular surgeon, Dr. Idalmis Bhatti,  She has clogged artery on right leg   Going for stent put in  She is doing better with checks   She is smoking  She got off tegretol   She is f/u Dr Harley Alvarez  She has good log and not many low sugars       Prior history . H/o DM 2 for 17 years   Current A1C is 12 % and symptoms/problems include fluctuating blood sugars   Current diabetic medications include Lantus 15 units and glucovance   Current monitoring regimen: home blood tests - 2 times daily   Home blood sugar records: trend: fluctuating a lot   Any episodes of hypoglycemia? no         Review of Systems   Constitutional: Negative. HENT: Negative. Eyes: Negative for pain and redness. Respiratory: Negative. Cardiovascular: Negative for chest pain, palpitations and leg swelling. Gastrointestinal: Negative. Negative for constipation. Genitourinary: Negative. Musculoskeletal: Negative for myalgias. Skin: Negative.    Neurological: Negative. Endo/Heme/Allergies: Negative. Psychiatric/Behavioral: Negative for depression and memory loss. The patient does not have insomnia. Physical Exam   Constitutional: She is oriented to person, place, and time. She appears well-developed and well-nourished. HENT:   Head: Normocephalic. Eyes: Conjunctivae and EOM are normal. Pupils are equal, round, and reactive to light. Neck: Normal range of motion. Neck supple. No JVD present. No tracheal deviation present. No thyromegaly present. Cardiovascular: Normal rate, regular rhythm and normal heart sounds. No murmur heard. Pulmonary/Chest: Breath sounds normal.   Abdominal: Soft. Bowel sounds are normal.   Musculoskeletal: Normal range of motion. Lymphadenopathy:   She has no cervical adenopathy. Neurological: She is alert and oriented to person, place, and time. She has normal reflexes. Skin: Skin is warm. Psychiatric: She has a normal mood and affect.        Diabetic foot exam:  Feb 2018   Left: Reflexes nd     Vibratory sensation normal    Proprioception nd   Sharp/dull discrimination nd    Filament test normal sensation with micro filament   Pulse DP: 2+ (normal)   Pulse PT: nd   Deformities: None  Right: Reflexes nd   Vibratory sensation normal   Proprioception nd   Sharp/dull discrimination normal   Filament test normal sensation with micro filament   Pulse DP: 2+ (normal)   Pulse PT: nd   Deformities: great toe- s/p hammer surgery, healed  On skin         Lab Results   Component Value Date/Time    Hemoglobin A1c 8.9 (H) 10/28/2019 08:42 AM    Hemoglobin A1c 8.8 (H) 07/02/2019 10:51 AM    Hemoglobin A1c 8.1 (H) 01/30/2019 09:15 AM    Glucose 169 (H) 10/28/2019 08:42 AM    Glucose (POC) 158 (H) 03/15/2013 11:14 AM    Glucose  02/14/2018 12:02 PM    Microalb/Creat ratio (ug/mg creat.) 121.0 (H) 10/28/2019 08:42 AM    LDL, calculated 76 10/28/2019 08:42 AM    Creatinine 0.89 10/28/2019 08:42 AM      Lab Results   Component Value Date/Time    Cholesterol, total 149 10/28/2019 08:42 AM    HDL Cholesterol 53 10/28/2019 08:42 AM    LDL, calculated 76 10/28/2019 08:42 AM    Triglyceride 100 10/28/2019 08:42 AM     Lab Results   Component Value Date/Time    ALT (SGPT) 36 (H) 10/28/2019 08:42 AM    AST (SGOT) 39 10/28/2019 08:42 AM    Alk. phosphatase 112 10/28/2019 08:42 AM    Bilirubin, total 0.4 10/28/2019 08:42 AM    Albumin 4.1 10/28/2019 08:42 AM    Protein, total 6.9 10/28/2019 08:42 AM    PLATELET 299 54/99/9303 09:15 AM     Lab Results   Component Value Date/Time    GFR est non-AA 69 10/28/2019 08:42 AM    GFR est AA 79 10/28/2019 08:42 AM    Creatinine 0.89 10/28/2019 08:42 AM    BUN 14 10/28/2019 08:42 AM    Sodium 144 10/28/2019 08:42 AM    Potassium 4.3 10/28/2019 08:42 AM    Chloride 103 10/28/2019 08:42 AM    CO2 24 10/28/2019 08:42 AM              ASSESSMENT and PLAN     1. Type 2 DM, un controlled with type 1 behaviour  : A1c is   8.9 %    From    Nov 2019   compared to  7.8 %   From oct 2018       Compared to   8.2 %    From  May 2018    Compared to      9 %      From     Feb 2018   Compared to  7.7 %     From    Today May 2017    compared to   8.2 %    From  Feb 2017   Compared to     7.2 %    From today aug 2016  Compared to  7.7 %     From   May 2016  Compared to  6.3 %      From      Sept 2015   Compared to   7.7 %  From  May 2015 ;    9.2 %    From feb 2015  Compared to   9.1 %    From oct 2014  compared to  9.1 % from May 2014       Glycemic control is being lost as she is not using the pump at all    on the pump - started in may 2015, on older version    She is told to document the numbers on pump from [employer]    And she should be able to control easily    Increase   checks and intake of insulin, not entering carbs at all ( notieceable again)  In the past   She is afraid that she would gain weight if she carbs correctly   Reviewed pump download , no info seen       She always look lost in recent visits. .. plus keeps looking for more weight loss   I could not start on ozempic unless she is supervised by pradeep cristina that she is versed with pump use    Continue jardiance  -- but its role is questionable and she decided to stay on it . She enjoyed the weight loss from being on Jardiance and having to use less insulin and even though she is the one who brought up the concern regarding the amputations reported on this class of medications, patient herself again insists that she does not want to go off the medication even though I have strictly advised her not to be on this medication. I have provided the information for amputation possibilities despite which she did not want to agree to stop the Jardiance      Continue on metformin  Behaves like type 1, but her c-pep is positive AMANDA negative      advised about checking blood sugars 4 times a day and maintaining log book. 2. Hypoglycemia : provided the education    3. HTN : on cozaar   , Proteinuria resolved   Lisinopril stopped for cough      4. Dyslipidemia : continue zocor. Patient is educated about benefits and adverse effects of statins and explained how benefits outweigh risk. 5. use of aspirin to prevent MI and TIA's discussed     6. On estrogen  Requesting refills on diflucan      7. Neuropathy :  She says she has no relief On  gabapentin to 300 AM  and 600 hs ( from 100, 100, 300 mg   Dosing)   Gabapentin is considered as a scheduled substance starting July 1 and I am not sure if that is obtainable for the patient  Stopped  tramadol 50mg        8. Depression : she is no longer f/u with Dr. Catherine Murphy         10. PAD - Right great toe  surgery , f/u with Dr. Donna Childs    she has pain constant in the leg   She is a smoker   Got Checked out vascular status by Dr. Johan Nick , she had angioplasties  Bilaterally   She has the hammer toe surgery a month ago , slow healing , she has hardware in place       11 .  CAD  :  Pt had stent placed   Pt f/u with Dr. Rigo Mon         Given her atherosclerotic status  -   Angioplasties in LE and stent in heart and TOE problem , repaired  And she is in pain   I AM ENCOURAGING HER TO STart JARDIANCE ( HER  ALSO AGREES ), BUT PT DECLINES IT            > 50 % visit time spent on counseling   Patient voiced understanding her plan of care

## 2019-11-25 NOTE — PROGRESS NOTES
Room 3    Identified pt with two pt identifiers(name and ). Reviewed record in preparation for visit and have obtained necessary documentation. All patient medications has been reviewed. Chief Complaint   Patient presents with    Diabetes     4 month f/u       Health Maintenance Due   Topic    Hepatitis C Screening     Pneumococcal 0-64 years (1 of 1 - PPSV23)    DTaP/Tdap/Td series (1 - Tdap)    PAP AKA CERVICAL CYTOLOGY     Shingrix Vaccine Age 50> (1 of 2)    BREAST CANCER SCRN MAMMOGRAM     FOBT Q 1 YEAR AGE 50-75     Influenza Age 5 to Adult     EYE EXAM RETINAL OR DILATED        Vitals:    19 1459   BP: 144/61   Pulse: 64   Resp: 18   Temp: 97.3 °F (36.3 °C)   TempSrc: Oral   Weight: 123 lb 1.6 oz (55.8 kg)   Height: 5' 3\" (1.6 m)   PainSc:   0 - No pain       Wt Readings from Last 3 Encounters:   19 123 lb 1.6 oz (55.8 kg)   19 122 lb (55.3 kg)   18 132 lb 12.8 oz (60.2 kg)     Temp Readings from Last 3 Encounters:   19 97.3 °F (36.3 °C) (Oral)   19 98 °F (36.7 °C) (Oral)   18 97.9 °F (36.6 °C) (Oral)     BP Readings from Last 3 Encounters:   19 144/61   19 149/65   18 133/58     Pulse Readings from Last 3 Encounters:   19 64   19 80   18 64       Lab Results   Component Value Date/Time    Hemoglobin A1c 8.9 (H) 10/28/2019 08:42 AM    Hemoglobin A1c (POC) 8.2 2018 04:46 PM       Coordination of Care Questionnaire:   1) Have you been to an emergency room, urgent care, or hospitalized since your last visit?   no       2. Have seen or consulted any other health care provider since your last visit? YES  [address] [facility]  3) Do you have an Advanced Directive/ Living Will in place?  NO  If yes, do we have a copy on file NO  If no, would you like information NO    Patient is accompanied by  I have received verbal consent from Kassy Lange to discuss any/all medical information while they are present in the room.

## 2019-11-25 NOTE — LETTER
12/1/19 Patient: Gem Rosado YOB: 1955 Date of Visit: 11/25/2019 Janes Laureano, [address] [facility] [address] [facility] [address] VIA Facsimile: [phone] Dear Janes Laureano MD, Thank you for referring Ms. Eriberto Riddle to [id number] [place] [address] for evaluation. My notes for this consultation are attached. If you have questions, please do not hesitate to call me. I look forward to following your patient along with you. Sincerely, Renato Leon MD

## 2019-12-20 ENCOUNTER — OP HISTORICAL/CONVERTED ENCOUNTER (OUTPATIENT)
Dept: OTHER | Age: 64
End: 2019-12-20

## 2020-01-01 ENCOUNTER — TELEPHONE (OUTPATIENT)
Dept: ENDOCRINOLOGY | Age: 65
End: 2020-01-01

## 2020-01-01 ENCOUNTER — HOSPITAL ENCOUNTER (INPATIENT)
Age: 65
LOS: 4 days | Discharge: HOME HEALTH | DRG: 254 | End: 2020-07-13
Payer: COMMERCIAL

## 2020-01-01 ENCOUNTER — HOSPITAL ENCOUNTER (OUTPATIENT)
Dept: GENERAL RADIOLOGY | Age: 65
Discharge: HOME | End: 2020-07-01
Payer: COMMERCIAL

## 2020-01-01 ENCOUNTER — HOSPITAL ENCOUNTER (OUTPATIENT)
Dept: PREADMISSION TESTING | Age: 65
Discharge: HOME | End: 2020-07-01
Payer: COMMERCIAL

## 2020-01-01 ENCOUNTER — ANESTHESIA (OUTPATIENT)
Dept: SURGERY | Age: 65
DRG: 254 | End: 2020-01-01
Payer: COMMERCIAL

## 2020-01-01 ENCOUNTER — OP HISTORICAL/CONVERTED ENCOUNTER (OUTPATIENT)
Dept: OTHER | Age: 65
End: 2020-01-01

## 2020-01-01 ENCOUNTER — ANESTHESIA EVENT (OUTPATIENT)
Dept: SURGERY | Age: 65
DRG: 254 | End: 2020-01-01
Payer: COMMERCIAL

## 2020-01-01 ENCOUNTER — VIRTUAL VISIT (OUTPATIENT)
Dept: ENDOCRINOLOGY | Age: 65
End: 2020-01-01

## 2020-01-01 ENCOUNTER — HOSPITAL ENCOUNTER (OUTPATIENT)
Dept: PREADMISSION TESTING | Age: 65
Discharge: HOME | End: 2020-07-05
Payer: COMMERCIAL

## 2020-01-01 VITALS
HEIGHT: 62 IN | WEIGHT: 121.91 LBS | SYSTOLIC BLOOD PRESSURE: 105 MMHG | RESPIRATION RATE: 16 BRPM | TEMPERATURE: 97.7 F | DIASTOLIC BLOOD PRESSURE: 57 MMHG | OXYGEN SATURATION: 96 % | BODY MASS INDEX: 22.43 KG/M2 | HEART RATE: 69 BPM

## 2020-01-01 VITALS
HEART RATE: 75 BPM | DIASTOLIC BLOOD PRESSURE: 66 MMHG | OXYGEN SATURATION: 94 % | BODY MASS INDEX: 23.37 KG/M2 | HEIGHT: 62 IN | WEIGHT: 127 LBS | SYSTOLIC BLOOD PRESSURE: 139 MMHG | RESPIRATION RATE: 19 BRPM | TEMPERATURE: 98.4 F

## 2020-01-01 DIAGNOSIS — I73.9 PAD (PERIPHERAL ARTERY DISEASE) (HCC): ICD-10-CM

## 2020-01-01 DIAGNOSIS — G62.9 NEUROPATHY: ICD-10-CM

## 2020-01-01 DIAGNOSIS — Z96.41 INSULIN PUMP STATUS: ICD-10-CM

## 2020-01-01 DIAGNOSIS — E10.9 TYPE 1 DIABETES MELLITUS WITHOUT COMPLICATION (HCC): ICD-10-CM

## 2020-01-01 DIAGNOSIS — E10.9 TYPE 1 DIABETES MELLITUS WITHOUT COMPLICATION (HCC): Primary | ICD-10-CM

## 2020-01-01 DIAGNOSIS — E78.2 MIXED HYPERLIPIDEMIA: ICD-10-CM

## 2020-01-01 DIAGNOSIS — I10 ESSENTIAL HYPERTENSION: ICD-10-CM

## 2020-01-01 DIAGNOSIS — I73.9 PVD (PERIPHERAL VASCULAR DISEASE) (HCC): Primary | ICD-10-CM

## 2020-01-01 DIAGNOSIS — M79.674 PAIN OF TOE OF RIGHT FOOT: ICD-10-CM

## 2020-01-01 LAB
ABO + RH BLD: NORMAL
ALBUMIN SERPL-MCNC: 3.6 G/DL (ref 3.5–5)
ALBUMIN SERPL-MCNC: 3.6 G/DL (ref 3.5–5)
ALBUMIN/GLOB SERPL: 0.9 {RATIO} (ref 1.1–2.2)
ALBUMIN/GLOB SERPL: 1 {RATIO} (ref 1.1–2.2)
ALP SERPL-CCNC: 111 U/L (ref 45–117)
ALP SERPL-CCNC: 131 U/L (ref 45–117)
ALT SERPL-CCNC: 27 U/L (ref 12–78)
ALT SERPL-CCNC: 39 U/L (ref 12–78)
ANION GAP SERPL CALC-SCNC: 2 MMOL/L (ref 5–15)
ANION GAP SERPL CALC-SCNC: 3 MMOL/L (ref 5–15)
ANION GAP SERPL CALC-SCNC: 4 MMOL/L (ref 5–15)
ANION GAP SERPL CALC-SCNC: 5 MMOL/L (ref 5–15)
ANION GAP SERPL CALC-SCNC: 7 MMOL/L (ref 5–15)
APTT PPP: 26.1 SEC (ref 22.1–32)
AST SERPL-CCNC: 26 U/L (ref 15–37)
AST SERPL-CCNC: 38 U/L (ref 15–37)
ATRIAL RATE: 52 BPM
BASOPHILS # BLD: 0 K/UL (ref 0–0.1)
BASOPHILS # BLD: 0 K/UL (ref 0–0.1)
BASOPHILS NFR BLD: 0 % (ref 0–1)
BASOPHILS NFR BLD: 1 % (ref 0–1)
BILIRUB SERPL-MCNC: 0.3 MG/DL (ref 0.2–1)
BILIRUB SERPL-MCNC: 0.5 MG/DL (ref 0.2–1)
BLOOD GROUP ANTIBODIES SERPL: NORMAL
BUN SERPL-MCNC: 13 MG/DL (ref 6–20)
BUN SERPL-MCNC: 13 MG/DL (ref 6–20)
BUN SERPL-MCNC: 16 MG/DL (ref 6–20)
BUN SERPL-MCNC: 6 MG/DL (ref 6–20)
BUN SERPL-MCNC: 8 MG/DL (ref 6–20)
BUN/CREAT SERPL: 10 (ref 12–20)
BUN/CREAT SERPL: 13 (ref 12–20)
BUN/CREAT SERPL: 14 (ref 12–20)
BUN/CREAT SERPL: 18 (ref 12–20)
BUN/CREAT SERPL: 18 (ref 12–20)
CALCIUM SERPL-MCNC: 8.4 MG/DL (ref 8.5–10.1)
CALCIUM SERPL-MCNC: 8.5 MG/DL (ref 8.5–10.1)
CALCIUM SERPL-MCNC: 8.6 MG/DL (ref 8.5–10.1)
CALCIUM SERPL-MCNC: 9.4 MG/DL (ref 8.5–10.1)
CALCIUM SERPL-MCNC: 9.4 MG/DL (ref 8.5–10.1)
CALCULATED P AXIS, ECG09: 51 DEGREES
CALCULATED R AXIS, ECG10: 90 DEGREES
CALCULATED T AXIS, ECG11: 7 DEGREES
CHLORIDE SERPL-SCNC: 104 MMOL/L (ref 97–108)
CHLORIDE SERPL-SCNC: 107 MMOL/L (ref 97–108)
CHLORIDE SERPL-SCNC: 107 MMOL/L (ref 97–108)
CHLORIDE SERPL-SCNC: 108 MMOL/L (ref 97–108)
CHLORIDE SERPL-SCNC: 110 MMOL/L (ref 97–108)
CHOLEST SERPL-MCNC: 141 MG/DL
CO2 SERPL-SCNC: 25 MMOL/L (ref 21–32)
CO2 SERPL-SCNC: 26 MMOL/L (ref 21–32)
CO2 SERPL-SCNC: 26 MMOL/L (ref 21–32)
CO2 SERPL-SCNC: 30 MMOL/L (ref 21–32)
CO2 SERPL-SCNC: 33 MMOL/L (ref 21–32)
CREAT SERPL-MCNC: 0.62 MG/DL (ref 0.55–1.02)
CREAT SERPL-MCNC: 0.63 MG/DL (ref 0.55–1.02)
CREAT SERPL-MCNC: 0.72 MG/DL (ref 0.55–1.02)
CREAT SERPL-MCNC: 0.87 MG/DL (ref 0.55–1.02)
CREAT SERPL-MCNC: 0.93 MG/DL (ref 0.55–1.02)
CREAT UR-MCNC: 102 MG/DL
DIAGNOSIS, 93000: NORMAL
DIFFERENTIAL METHOD BLD: ABNORMAL
DIFFERENTIAL METHOD BLD: ABNORMAL
EOSINOPHIL # BLD: 0 K/UL (ref 0–0.4)
EOSINOPHIL # BLD: 0 K/UL (ref 0–0.4)
EOSINOPHIL NFR BLD: 0 % (ref 0–7)
EOSINOPHIL NFR BLD: 0 % (ref 0–7)
ERYTHROCYTE [DISTWIDTH] IN BLOOD BY AUTOMATED COUNT: 13.1 % (ref 11.5–14.5)
ERYTHROCYTE [DISTWIDTH] IN BLOOD BY AUTOMATED COUNT: 13.4 % (ref 11.5–14.5)
ERYTHROCYTE [DISTWIDTH] IN BLOOD BY AUTOMATED COUNT: 13.5 % (ref 11.5–14.5)
EST. AVERAGE GLUCOSE BLD GHB EST-MCNC: 134 MG/DL
EST. AVERAGE GLUCOSE BLD GHB EST-MCNC: 154 MG/DL
GLOBULIN SER CALC-MCNC: 3.7 G/DL (ref 2–4)
GLOBULIN SER CALC-MCNC: 4 G/DL (ref 2–4)
GLUCOSE BLD STRIP.AUTO-MCNC: 100 MG/DL (ref 65–100)
GLUCOSE BLD STRIP.AUTO-MCNC: 103 MG/DL (ref 65–100)
GLUCOSE BLD STRIP.AUTO-MCNC: 109 MG/DL (ref 65–100)
GLUCOSE BLD STRIP.AUTO-MCNC: 113 MG/DL (ref 65–100)
GLUCOSE BLD STRIP.AUTO-MCNC: 117 MG/DL (ref 65–100)
GLUCOSE BLD STRIP.AUTO-MCNC: 119 MG/DL (ref 65–100)
GLUCOSE BLD STRIP.AUTO-MCNC: 130 MG/DL (ref 65–100)
GLUCOSE BLD STRIP.AUTO-MCNC: 139 MG/DL (ref 65–100)
GLUCOSE BLD STRIP.AUTO-MCNC: 154 MG/DL (ref 65–100)
GLUCOSE BLD STRIP.AUTO-MCNC: 161 MG/DL (ref 65–100)
GLUCOSE BLD STRIP.AUTO-MCNC: 165 MG/DL (ref 65–100)
GLUCOSE BLD STRIP.AUTO-MCNC: 172 MG/DL (ref 65–100)
GLUCOSE BLD STRIP.AUTO-MCNC: 179 MG/DL (ref 65–100)
GLUCOSE BLD STRIP.AUTO-MCNC: 86 MG/DL (ref 65–100)
GLUCOSE BLD STRIP.AUTO-MCNC: 96 MG/DL (ref 65–100)
GLUCOSE BLD STRIP.AUTO-MCNC: 97 MG/DL (ref 65–100)
GLUCOSE SERPL-MCNC: 101 MG/DL (ref 65–100)
GLUCOSE SERPL-MCNC: 143 MG/DL (ref 65–100)
GLUCOSE SERPL-MCNC: 81 MG/DL (ref 65–100)
GLUCOSE SERPL-MCNC: 89 MG/DL (ref 65–100)
GLUCOSE SERPL-MCNC: 98 MG/DL (ref 65–100)
HBA1C MFR BLD: 6.3 % (ref 4–5.6)
HBA1C MFR BLD: 7 % (ref 4–5.6)
HCT VFR BLD AUTO: 42.8 % (ref 35–47)
HCT VFR BLD AUTO: 44.5 % (ref 35–47)
HCT VFR BLD AUTO: 52.8 % (ref 35–47)
HDLC SERPL-MCNC: 52 MG/DL
HDLC SERPL: 2.7 {RATIO} (ref 0–5)
HGB BLD-MCNC: 14.1 G/DL (ref 11.5–16)
HGB BLD-MCNC: 14.5 G/DL (ref 11.5–16)
HGB BLD-MCNC: 17.4 G/DL (ref 11.5–16)
IMM GRANULOCYTES # BLD AUTO: 0 K/UL (ref 0–0.04)
IMM GRANULOCYTES # BLD AUTO: 0.1 K/UL (ref 0–0.04)
IMM GRANULOCYTES NFR BLD AUTO: 1 % (ref 0–0.5)
IMM GRANULOCYTES NFR BLD AUTO: 1 % (ref 0–0.5)
INR PPP: 1.1 (ref 0.9–1.1)
LDLC SERPL CALC-MCNC: 66.2 MG/DL (ref 0–100)
LIPID PROFILE,FLP: NORMAL
LYMPHOCYTES # BLD: 1.2 K/UL (ref 0.8–3.5)
LYMPHOCYTES # BLD: 2.2 K/UL (ref 0.8–3.5)
LYMPHOCYTES NFR BLD: 10 % (ref 12–49)
LYMPHOCYTES NFR BLD: 35 % (ref 12–49)
MAGNESIUM SERPL-MCNC: 1.6 MG/DL (ref 1.6–2.4)
MAGNESIUM SERPL-MCNC: 1.6 MG/DL (ref 1.6–2.4)
MCH RBC QN AUTO: 29.9 PG (ref 26–34)
MCH RBC QN AUTO: 30 PG (ref 26–34)
MCH RBC QN AUTO: 30 PG (ref 26–34)
MCHC RBC AUTO-ENTMCNC: 32.6 G/DL (ref 30–36.5)
MCHC RBC AUTO-ENTMCNC: 32.9 G/DL (ref 30–36.5)
MCHC RBC AUTO-ENTMCNC: 33 G/DL (ref 30–36.5)
MCV RBC AUTO: 90.7 FL (ref 80–99)
MCV RBC AUTO: 91 FL (ref 80–99)
MCV RBC AUTO: 92.1 FL (ref 80–99)
MICROALBUMIN UR-MCNC: 4.77 MG/DL
MICROALBUMIN/CREAT UR-RTO: 47 MG/G (ref 0–30)
MONOCYTES # BLD: 0.5 K/UL (ref 0–1)
MONOCYTES # BLD: 1.3 K/UL (ref 0–1)
MONOCYTES NFR BLD: 11 % (ref 5–13)
MONOCYTES NFR BLD: 8 % (ref 5–13)
NEUTS SEG # BLD: 3.5 K/UL (ref 1.8–8)
NEUTS SEG # BLD: 9.7 K/UL (ref 1.8–8)
NEUTS SEG NFR BLD: 55 % (ref 32–75)
NEUTS SEG NFR BLD: 79 % (ref 32–75)
NRBC # BLD: 0 K/UL (ref 0–0.01)
NRBC BLD-RTO: 0 PER 100 WBC
P-R INTERVAL, ECG05: 152 MS
PHOSPHATE SERPL-MCNC: 1.1 MG/DL (ref 2.6–4.7)
PHOSPHATE SERPL-MCNC: 2.2 MG/DL (ref 2.6–4.7)
PLATELET # BLD AUTO: 158 K/UL (ref 150–400)
PLATELET # BLD AUTO: 185 K/UL (ref 150–400)
PLATELET # BLD AUTO: 214 K/UL (ref 150–400)
PMV BLD AUTO: 9.5 FL (ref 8.9–12.9)
PMV BLD AUTO: 9.7 FL (ref 8.9–12.9)
PMV BLD AUTO: 9.9 FL (ref 8.9–12.9)
POTASSIUM SERPL-SCNC: 3.5 MMOL/L (ref 3.5–5.1)
POTASSIUM SERPL-SCNC: 3.6 MMOL/L (ref 3.5–5.1)
POTASSIUM SERPL-SCNC: 3.9 MMOL/L (ref 3.5–5.1)
POTASSIUM SERPL-SCNC: 3.9 MMOL/L (ref 3.5–5.1)
POTASSIUM SERPL-SCNC: 4.4 MMOL/L (ref 3.5–5.1)
PROT SERPL-MCNC: 7.3 G/DL (ref 6.4–8.2)
PROT SERPL-MCNC: 7.6 G/DL (ref 6.4–8.2)
PROTHROMBIN TIME: 11 SEC (ref 9–11.1)
Q-T INTERVAL, ECG07: 450 MS
QRS DURATION, ECG06: 70 MS
QTC CALCULATION (BEZET), ECG08: 418 MS
RBC # BLD AUTO: 4.72 M/UL (ref 3.8–5.2)
RBC # BLD AUTO: 4.83 M/UL (ref 3.8–5.2)
RBC # BLD AUTO: 5.8 M/UL (ref 3.8–5.2)
SARS-COV-2, COV2NT: NOT DETECTED
SERVICE CMNT-IMP: ABNORMAL
SERVICE CMNT-IMP: NORMAL
SODIUM SERPL-SCNC: 137 MMOL/L (ref 136–145)
SODIUM SERPL-SCNC: 139 MMOL/L (ref 136–145)
SODIUM SERPL-SCNC: 140 MMOL/L (ref 136–145)
SODIUM SERPL-SCNC: 140 MMOL/L (ref 136–145)
SODIUM SERPL-SCNC: 141 MMOL/L (ref 136–145)
SPECIMEN EXP DATE BLD: NORMAL
THERAPEUTIC RANGE,PTTT: NORMAL SECS (ref 58–77)
TRIGL SERPL-MCNC: 114 MG/DL (ref ?–150)
VENTRICULAR RATE, ECG03: 52 BPM
VLDLC SERPL CALC-MCNC: 22.8 MG/DL
WBC # BLD AUTO: 11.8 K/UL (ref 3.6–11)
WBC # BLD AUTO: 12.2 K/UL (ref 3.6–11)
WBC # BLD AUTO: 6.3 K/UL (ref 3.6–11)

## 2020-01-01 PROCEDURE — 74011250636 HC RX REV CODE- 250/636: Performed by: ANESTHESIOLOGY

## 2020-01-01 PROCEDURE — 65270000029 HC RM PRIVATE

## 2020-01-01 PROCEDURE — 80048 BASIC METABOLIC PNL TOTAL CA: CPT

## 2020-01-01 PROCEDURE — 36415 COLL VENOUS BLD VENIPUNCTURE: CPT

## 2020-01-01 PROCEDURE — 97530 THERAPEUTIC ACTIVITIES: CPT

## 2020-01-01 PROCEDURE — 77030010507 HC ADH SKN DERMBND J&J -B

## 2020-01-01 PROCEDURE — 65660000000 HC RM CCU STEPDOWN

## 2020-01-01 PROCEDURE — 83036 HEMOGLOBIN GLYCOSYLATED A1C: CPT

## 2020-01-01 PROCEDURE — 74011250636 HC RX REV CODE- 250/636

## 2020-01-01 PROCEDURE — 94760 N-INVAS EAR/PLS OXIMETRY 1: CPT

## 2020-01-01 PROCEDURE — 77030002987 HC SUT PROL J&J -B

## 2020-01-01 PROCEDURE — 74011250636 HC RX REV CODE- 250/636: Performed by: NURSE ANESTHETIST, CERTIFIED REGISTERED

## 2020-01-01 PROCEDURE — 76060000039 HC ANESTHESIA 4 TO 4.5 HR

## 2020-01-01 PROCEDURE — 74011250637 HC RX REV CODE- 250/637

## 2020-01-01 PROCEDURE — 83735 ASSAY OF MAGNESIUM: CPT

## 2020-01-01 PROCEDURE — 87635 SARS-COV-2 COVID-19 AMP PRB: CPT

## 2020-01-01 PROCEDURE — 82962 GLUCOSE BLOOD TEST: CPT

## 2020-01-01 PROCEDURE — 77030031139 HC SUT VCRL2 J&J -A

## 2020-01-01 PROCEDURE — 85730 THROMBOPLASTIN TIME PARTIAL: CPT

## 2020-01-01 PROCEDURE — 94762 N-INVAS EAR/PLS OXIMTRY CONT: CPT

## 2020-01-01 PROCEDURE — 74011636637 HC RX REV CODE- 636/637: Performed by: FAMILY MEDICINE

## 2020-01-01 PROCEDURE — 94640 AIRWAY INHALATION TREATMENT: CPT

## 2020-01-01 PROCEDURE — 85025 COMPLETE CBC W/AUTO DIFF WBC: CPT

## 2020-01-01 PROCEDURE — 74011000258 HC RX REV CODE- 258

## 2020-01-01 PROCEDURE — 77030002933 HC SUT MCRYL J&J -A

## 2020-01-01 PROCEDURE — 84100 ASSAY OF PHOSPHORUS: CPT

## 2020-01-01 PROCEDURE — 77030020263 HC SOL INJ SOD CL0.9% LFCR 1000ML

## 2020-01-01 PROCEDURE — 76010000175 HC OR TIME 4 TO 4.5 HR INTENSV-TIER 1

## 2020-01-01 PROCEDURE — 74011000250 HC RX REV CODE- 250: Performed by: NURSE ANESTHETIST, CERTIFIED REGISTERED

## 2020-01-01 PROCEDURE — 74011250636 HC RX REV CODE- 250/636: Performed by: INTERNAL MEDICINE

## 2020-01-01 PROCEDURE — 85027 COMPLETE CBC AUTOMATED: CPT

## 2020-01-01 PROCEDURE — 77030013079 HC BLNKT BAIR HGGR 3M -A: Performed by: ANESTHESIOLOGY

## 2020-01-01 PROCEDURE — 77030002986 HC SUT PROL J&J -A

## 2020-01-01 PROCEDURE — 76210000006 HC OR PH I REC 0.5 TO 1 HR

## 2020-01-01 PROCEDURE — 77030005513 HC CATH URETH FOL11 MDII -B

## 2020-01-01 PROCEDURE — 77030040922 HC BLNKT HYPOTHRM STRY -A

## 2020-01-01 PROCEDURE — 77030019940 HC BLNKT HYPOTHRM STRY -B

## 2020-01-01 PROCEDURE — 74011250637 HC RX REV CODE- 250/637: Performed by: INTERNAL MEDICINE

## 2020-01-01 PROCEDURE — 74011000250 HC RX REV CODE- 250

## 2020-01-01 PROCEDURE — C1768 GRAFT, VASCULAR: HCPCS

## 2020-01-01 PROCEDURE — 80053 COMPREHEN METABOLIC PANEL: CPT

## 2020-01-01 PROCEDURE — 77030029684 HC NEB SM VOL KT MONA -A

## 2020-01-01 PROCEDURE — 74011000258 HC RX REV CODE- 258: Performed by: NURSE ANESTHETIST, CERTIFIED REGISTERED

## 2020-01-01 PROCEDURE — 93005 ELECTROCARDIOGRAM TRACING: CPT

## 2020-01-01 PROCEDURE — 77030026438 HC STYL ET INTUB CARD -A: Performed by: ANESTHESIOLOGY

## 2020-01-01 PROCEDURE — 77010033678 HC OXYGEN DAILY

## 2020-01-01 PROCEDURE — 86900 BLOOD TYPING SEROLOGIC ABO: CPT

## 2020-01-01 PROCEDURE — 97161 PT EVAL LOW COMPLEX 20 MIN: CPT

## 2020-01-01 PROCEDURE — 94664 DEMO&/EVAL PT USE INHALER: CPT

## 2020-01-01 PROCEDURE — 77030019908 HC STETH ESOPH SIMS -A: Performed by: ANESTHESIOLOGY

## 2020-01-01 PROCEDURE — 77030020061 HC IV BLD WRMR ADMIN SET 3M -B: Performed by: ANESTHESIOLOGY

## 2020-01-01 PROCEDURE — 85610 PROTHROMBIN TIME: CPT

## 2020-01-01 PROCEDURE — 77030040361 HC SLV COMPR DVT MDII -B

## 2020-01-01 PROCEDURE — 77030014647 HC SEAL FBRN TISSL BAXT -D

## 2020-01-01 PROCEDURE — 77030027138 HC INCENT SPIROMETER -A

## 2020-01-01 PROCEDURE — 041K0JQ BYPASS RIGHT FEMORAL ARTERY TO LOWER EXTREMITY ARTERY WITH SYNTHETIC SUBSTITUTE, OPEN APPROACH: ICD-10-PCS

## 2020-01-01 PROCEDURE — 74011000250 HC RX REV CODE- 250: Performed by: INTERNAL MEDICINE

## 2020-01-01 PROCEDURE — 71046 X-RAY EXAM CHEST 2 VIEWS: CPT

## 2020-01-01 PROCEDURE — 77030011640 HC PAD GRND REM COVD -A

## 2020-01-01 PROCEDURE — 77030008684 HC TU ET CUF COVD -B: Performed by: ANESTHESIOLOGY

## 2020-01-01 DEVICE — DISTAFLO® BYPASS GRAFT WITH FLEX SMALL BEADING STANDARD CUFF, 6 MM X 80 CM
Type: IMPLANTABLE DEVICE | Site: LEG | Status: FUNCTIONAL
Brand: DISTAFLO® BYPASS GRAFTS

## 2020-01-01 RX ORDER — FENTANYL CITRATE 50 UG/ML
INJECTION, SOLUTION INTRAMUSCULAR; INTRAVENOUS AS NEEDED
Status: DISCONTINUED | OUTPATIENT
Start: 2020-01-01 | End: 2020-01-01 | Stop reason: HOSPADM

## 2020-01-01 RX ORDER — SODIUM CHLORIDE, SODIUM LACTATE, POTASSIUM CHLORIDE, CALCIUM CHLORIDE 600; 310; 30; 20 MG/100ML; MG/100ML; MG/100ML; MG/100ML
100 INJECTION, SOLUTION INTRAVENOUS CONTINUOUS
Status: DISCONTINUED | OUTPATIENT
Start: 2020-01-01 | End: 2020-01-01 | Stop reason: HOSPADM

## 2020-01-01 RX ORDER — VALACYCLOVIR HYDROCHLORIDE 500 MG/1
500 TABLET, FILM COATED ORAL DAILY
Status: DISCONTINUED | OUTPATIENT
Start: 2020-01-01 | End: 2020-01-01 | Stop reason: HOSPADM

## 2020-01-01 RX ORDER — IPRATROPIUM BROMIDE AND ALBUTEROL SULFATE 2.5; .5 MG/3ML; MG/3ML
3 SOLUTION RESPIRATORY (INHALATION)
Status: DISCONTINUED | OUTPATIENT
Start: 2020-01-01 | End: 2020-01-01 | Stop reason: HOSPADM

## 2020-01-01 RX ORDER — INSULIN GLARGINE 100 [IU]/ML
18 INJECTION, SOLUTION SUBCUTANEOUS
Status: DISCONTINUED | OUTPATIENT
Start: 2020-01-01 | End: 2020-01-01 | Stop reason: HOSPADM

## 2020-01-01 RX ORDER — VARENICLINE TARTRATE 1 MG/1
1 TABLET, FILM COATED ORAL
COMMUNITY

## 2020-01-01 RX ORDER — SODIUM CHLORIDE 0.9 % (FLUSH) 0.9 %
5-40 SYRINGE (ML) INJECTION AS NEEDED
Status: DISCONTINUED | OUTPATIENT
Start: 2020-01-01 | End: 2020-01-01 | Stop reason: HOSPADM

## 2020-01-01 RX ORDER — CLOPIDOGREL BISULFATE 75 MG/1
75 TABLET ORAL DAILY
Status: DISCONTINUED | OUTPATIENT
Start: 2020-01-01 | End: 2020-01-01 | Stop reason: HOSPADM

## 2020-01-01 RX ORDER — MAGNESIUM SULFATE 100 %
4 CRYSTALS MISCELLANEOUS AS NEEDED
Status: DISCONTINUED | OUTPATIENT
Start: 2020-01-01 | End: 2020-01-01 | Stop reason: HOSPADM

## 2020-01-01 RX ORDER — BISMUTH SUBSALICYLATE 262 MG
1 TABLET,CHEWABLE ORAL DAILY
COMMUNITY

## 2020-01-01 RX ORDER — MIDAZOLAM HYDROCHLORIDE 1 MG/ML
INJECTION, SOLUTION INTRAMUSCULAR; INTRAVENOUS AS NEEDED
Status: DISCONTINUED | OUTPATIENT
Start: 2020-01-01 | End: 2020-01-01 | Stop reason: HOSPADM

## 2020-01-01 RX ORDER — METOPROLOL TARTRATE 25 MG/1
12.5 TABLET, FILM COATED ORAL 2 TIMES DAILY
COMMUNITY

## 2020-01-01 RX ORDER — TRAZODONE HYDROCHLORIDE 100 MG/1
200 TABLET ORAL
Status: DISCONTINUED | OUTPATIENT
Start: 2020-01-01 | End: 2020-01-01 | Stop reason: HOSPADM

## 2020-01-01 RX ORDER — GABAPENTIN 300 MG/1
CAPSULE ORAL
Qty: 90 CAP | Refills: 5 | Status: SHIPPED | OUTPATIENT
Start: 2020-01-01 | End: 2020-01-01

## 2020-01-01 RX ORDER — OXYCODONE AND ACETAMINOPHEN 5; 325 MG/1; MG/1
1 TABLET ORAL
Status: DISCONTINUED | OUTPATIENT
Start: 2020-01-01 | End: 2020-01-01 | Stop reason: HOSPADM

## 2020-01-01 RX ORDER — GABAPENTIN 300 MG/1
600 CAPSULE ORAL
Status: DISCONTINUED | OUTPATIENT
Start: 2020-01-01 | End: 2020-01-01 | Stop reason: HOSPADM

## 2020-01-01 RX ORDER — DEXTROSE 50 % IN WATER (D50W) INTRAVENOUS SYRINGE
12.5-25 AS NEEDED
Status: DISCONTINUED | OUTPATIENT
Start: 2020-01-01 | End: 2020-01-01 | Stop reason: HOSPADM

## 2020-01-01 RX ORDER — VARENICLINE TARTRATE 0.5 MG/1
1 TABLET, FILM COATED ORAL
Status: DISCONTINUED | OUTPATIENT
Start: 2020-01-01 | End: 2020-01-01 | Stop reason: HOSPADM

## 2020-01-01 RX ORDER — OXYCODONE AND ACETAMINOPHEN 5; 325 MG/1; MG/1
1 TABLET ORAL
Qty: 30 TAB | Refills: 0 | Status: SHIPPED | OUTPATIENT
Start: 2020-01-01 | End: 2020-01-01

## 2020-01-01 RX ORDER — SUCCINYLCHOLINE CHLORIDE 20 MG/ML
INJECTION INTRAMUSCULAR; INTRAVENOUS AS NEEDED
Status: DISCONTINUED | OUTPATIENT
Start: 2020-01-01 | End: 2020-01-01 | Stop reason: HOSPADM

## 2020-01-01 RX ORDER — METOPROLOL TARTRATE 25 MG/1
12.5 TABLET, FILM COATED ORAL 2 TIMES DAILY
Status: DISCONTINUED | OUTPATIENT
Start: 2020-01-01 | End: 2020-01-01 | Stop reason: HOSPADM

## 2020-01-01 RX ORDER — GABAPENTIN 300 MG/1
300 CAPSULE ORAL
COMMUNITY
End: 2021-01-01

## 2020-01-01 RX ORDER — LOSARTAN POTASSIUM 50 MG/1
25 TABLET ORAL DAILY
Status: DISCONTINUED | OUTPATIENT
Start: 2020-01-01 | End: 2020-01-01 | Stop reason: HOSPADM

## 2020-01-01 RX ORDER — ROCURONIUM BROMIDE 10 MG/ML
INJECTION, SOLUTION INTRAVENOUS AS NEEDED
Status: DISCONTINUED | OUTPATIENT
Start: 2020-01-01 | End: 2020-01-01 | Stop reason: HOSPADM

## 2020-01-01 RX ORDER — ALBUTEROL SULFATE 90 UG/1
2 AEROSOL, METERED RESPIRATORY (INHALATION)
COMMUNITY

## 2020-01-01 RX ORDER — HYDROMORPHONE HYDROCHLORIDE 1 MG/ML
.25-1 INJECTION, SOLUTION INTRAMUSCULAR; INTRAVENOUS; SUBCUTANEOUS
Status: DISCONTINUED | OUTPATIENT
Start: 2020-01-01 | End: 2020-01-01 | Stop reason: HOSPADM

## 2020-01-01 RX ORDER — GABAPENTIN 300 MG/1
600 CAPSULE ORAL
COMMUNITY

## 2020-01-01 RX ORDER — PROPOFOL 10 MG/ML
INJECTION, EMULSION INTRAVENOUS AS NEEDED
Status: DISCONTINUED | OUTPATIENT
Start: 2020-01-01 | End: 2020-01-01 | Stop reason: HOSPADM

## 2020-01-01 RX ORDER — HEPARIN SODIUM 5000 [USP'U]/ML
5000 INJECTION, SOLUTION INTRAVENOUS; SUBCUTANEOUS EVERY 8 HOURS
Status: DISCONTINUED | OUTPATIENT
Start: 2020-01-01 | End: 2020-01-01 | Stop reason: HOSPADM

## 2020-01-01 RX ORDER — INSULIN LISPRO 100 [IU]/ML
INJECTION, SOLUTION INTRAVENOUS; SUBCUTANEOUS
Qty: 20 ML | Refills: 5 | Status: SHIPPED | OUTPATIENT
Start: 2020-01-01 | End: 2020-01-01

## 2020-01-01 RX ORDER — SODIUM,POTASSIUM PHOSPHATES 280-250MG
2 POWDER IN PACKET (EA) ORAL ONCE
Status: COMPLETED | OUTPATIENT
Start: 2020-01-01 | End: 2020-01-01

## 2020-01-01 RX ORDER — IPRATROPIUM BROMIDE 0.5 MG/2.5ML
0.5 SOLUTION RESPIRATORY (INHALATION)
Status: DISCONTINUED | OUTPATIENT
Start: 2020-01-01 | End: 2020-01-01 | Stop reason: HOSPADM

## 2020-01-01 RX ORDER — DIPHENHYDRAMINE HCL 25 MG
25 CAPSULE ORAL
Status: DISCONTINUED | OUTPATIENT
Start: 2020-01-01 | End: 2020-01-01 | Stop reason: HOSPADM

## 2020-01-01 RX ORDER — LIDOCAINE HYDROCHLORIDE 20 MG/ML
INJECTION, SOLUTION EPIDURAL; INFILTRATION; INTRACAUDAL; PERINEURAL AS NEEDED
Status: DISCONTINUED | OUTPATIENT
Start: 2020-01-01 | End: 2020-01-01 | Stop reason: HOSPADM

## 2020-01-01 RX ORDER — LANOLIN ALCOHOL/MO/W.PET/CERES
325 CREAM (GRAM) TOPICAL DAILY
Status: DISCONTINUED | OUTPATIENT
Start: 2020-01-01 | End: 2020-01-01 | Stop reason: HOSPADM

## 2020-01-01 RX ORDER — METFORMIN HYDROCHLORIDE 500 MG/1
1000 TABLET ORAL 2 TIMES DAILY WITH MEALS
Status: DISCONTINUED | OUTPATIENT
Start: 2020-01-01 | End: 2020-01-01 | Stop reason: HOSPADM

## 2020-01-01 RX ORDER — LANOLIN ALCOHOL/MO/W.PET/CERES
5000 CREAM (GRAM) TOPICAL DAILY
Status: DISCONTINUED | OUTPATIENT
Start: 2020-01-01 | End: 2020-01-01 | Stop reason: HOSPADM

## 2020-01-01 RX ORDER — LOPERAMIDE HYDROCHLORIDE 2 MG/1
2 CAPSULE ORAL AS NEEDED
Status: DISCONTINUED | OUTPATIENT
Start: 2020-01-01 | End: 2020-01-01 | Stop reason: HOSPADM

## 2020-01-01 RX ORDER — HYDROMORPHONE HYDROCHLORIDE 2 MG/ML
INJECTION, SOLUTION INTRAMUSCULAR; INTRAVENOUS; SUBCUTANEOUS AS NEEDED
Status: DISCONTINUED | OUTPATIENT
Start: 2020-01-01 | End: 2020-01-01 | Stop reason: HOSPADM

## 2020-01-01 RX ORDER — SODIUM CHLORIDE 0.9 % (FLUSH) 0.9 %
5-40 SYRINGE (ML) INJECTION EVERY 8 HOURS
Status: DISCONTINUED | OUTPATIENT
Start: 2020-01-01 | End: 2020-01-01 | Stop reason: HOSPADM

## 2020-01-01 RX ORDER — ONDANSETRON 2 MG/ML
INJECTION INTRAMUSCULAR; INTRAVENOUS AS NEEDED
Status: DISCONTINUED | OUTPATIENT
Start: 2020-01-01 | End: 2020-01-01 | Stop reason: HOSPADM

## 2020-01-01 RX ORDER — HYDROMORPHONE HYDROCHLORIDE 2 MG/ML
1 INJECTION, SOLUTION INTRAMUSCULAR; INTRAVENOUS; SUBCUTANEOUS
Status: DISCONTINUED | OUTPATIENT
Start: 2020-01-01 | End: 2020-01-01

## 2020-01-01 RX ORDER — SEMAGLUTIDE 1.34 MG/ML
0.25 INJECTION, SOLUTION SUBCUTANEOUS
Qty: 6 BOX | Refills: 4 | Status: SHIPPED | OUTPATIENT
Start: 2020-01-01 | End: 2021-01-01

## 2020-01-01 RX ORDER — GABAPENTIN 300 MG/1
300 CAPSULE ORAL
Status: DISCONTINUED | OUTPATIENT
Start: 2020-01-01 | End: 2020-01-01 | Stop reason: HOSPADM

## 2020-01-01 RX ORDER — ATORVASTATIN CALCIUM 10 MG/1
10 TABLET, FILM COATED ORAL DAILY
Status: DISCONTINUED | OUTPATIENT
Start: 2020-01-01 | End: 2020-01-01 | Stop reason: HOSPADM

## 2020-01-01 RX ORDER — HEPARIN SODIUM 1000 [USP'U]/ML
INJECTION, SOLUTION INTRAVENOUS; SUBCUTANEOUS AS NEEDED
Status: DISCONTINUED | OUTPATIENT
Start: 2020-01-01 | End: 2020-01-01 | Stop reason: HOSPADM

## 2020-01-01 RX ORDER — MORPHINE SULFATE 2 MG/ML
2 INJECTION, SOLUTION INTRAMUSCULAR; INTRAVENOUS
Status: DISCONTINUED | OUTPATIENT
Start: 2020-01-01 | End: 2020-01-01 | Stop reason: HOSPADM

## 2020-01-01 RX ORDER — IPRATROPIUM BROMIDE AND ALBUTEROL SULFATE 2.5; .5 MG/3ML; MG/3ML
3 SOLUTION RESPIRATORY (INHALATION)
Status: DISCONTINUED | OUTPATIENT
Start: 2020-01-01 | End: 2020-01-01

## 2020-01-01 RX ORDER — THERA TABS 400 MCG
1 TAB ORAL DAILY
Status: DISCONTINUED | OUTPATIENT
Start: 2020-01-01 | End: 2020-01-01 | Stop reason: HOSPADM

## 2020-01-01 RX ORDER — UMECLIDINIUM 62.5 UG/1
1 AEROSOL, POWDER ORAL DAILY
COMMUNITY

## 2020-01-01 RX ORDER — SODIUM CHLORIDE 9 MG/ML
75 INJECTION, SOLUTION INTRAVENOUS CONTINUOUS
Status: DISPENSED | OUTPATIENT
Start: 2020-01-01 | End: 2020-01-01

## 2020-01-01 RX ORDER — LIDOCAINE HYDROCHLORIDE 10 MG/ML
0.1 INJECTION, SOLUTION EPIDURAL; INFILTRATION; INTRACAUDAL; PERINEURAL AS NEEDED
Status: DISCONTINUED | OUTPATIENT
Start: 2020-01-01 | End: 2020-01-01 | Stop reason: HOSPADM

## 2020-01-01 RX ADMIN — IPRATROPIUM BROMIDE AND ALBUTEROL SULFATE 3 ML: .5; 3 SOLUTION RESPIRATORY (INHALATION) at 11:18

## 2020-01-01 RX ADMIN — METOPROLOL TARTRATE 12.5 MG: 25 TABLET, FILM COATED ORAL at 17:36

## 2020-01-01 RX ADMIN — HEPARIN SODIUM 5000 UNITS: 5000 INJECTION INTRAVENOUS; SUBCUTANEOUS at 13:02

## 2020-01-01 RX ADMIN — LOPERAMIDE HYDROCHLORIDE 2 MG: 2 CAPSULE ORAL at 12:13

## 2020-01-01 RX ADMIN — GABAPENTIN 300 MG: 300 CAPSULE ORAL at 05:06

## 2020-01-01 RX ADMIN — OXYCODONE HYDROCHLORIDE AND ACETAMINOPHEN 1 TABLET: 5; 325 TABLET ORAL at 13:28

## 2020-01-01 RX ADMIN — FENTANYL CITRATE 50 MCG: 50 INJECTION, SOLUTION INTRAMUSCULAR; INTRAVENOUS at 16:26

## 2020-01-01 RX ADMIN — LIDOCAINE HYDROCHLORIDE 60 MG: 20 INJECTION, SOLUTION EPIDURAL; INFILTRATION; INTRACAUDAL; PERINEURAL at 16:35

## 2020-01-01 RX ADMIN — HEPARIN SODIUM 5000 UNITS: 5000 INJECTION INTRAVENOUS; SUBCUTANEOUS at 21:33

## 2020-01-01 RX ADMIN — Medication 10 ML: at 06:41

## 2020-01-01 RX ADMIN — HYDROMORPHONE HYDROCHLORIDE 1 MG: 2 INJECTION, SOLUTION INTRAMUSCULAR; INTRAVENOUS; SUBCUTANEOUS at 06:40

## 2020-01-01 RX ADMIN — INSULIN GLARGINE 18 UNITS: 100 INJECTION, SOLUTION SUBCUTANEOUS at 21:51

## 2020-01-01 RX ADMIN — SODIUM CHLORIDE 75 ML/HR: 900 INJECTION, SOLUTION INTRAVENOUS at 20:58

## 2020-01-01 RX ADMIN — SODIUM CHLORIDE, SODIUM LACTATE, POTASSIUM CHLORIDE, AND CALCIUM CHLORIDE 100 ML/HR: 600; 310; 30; 20 INJECTION, SOLUTION INTRAVENOUS at 15:13

## 2020-01-01 RX ADMIN — ATORVASTATIN CALCIUM 10 MG: 10 TABLET, FILM COATED ORAL at 08:21

## 2020-01-01 RX ADMIN — HYDROMORPHONE HYDROCHLORIDE 0.4 MG: 2 INJECTION INTRAMUSCULAR; INTRAVENOUS; SUBCUTANEOUS at 18:37

## 2020-01-01 RX ADMIN — FERROUS SULFATE TAB 325 MG (65 MG ELEMENTAL FE) 325 MG: 325 (65 FE) TAB at 09:10

## 2020-01-01 RX ADMIN — SODIUM CHLORIDE 75 ML/HR: 900 INJECTION, SOLUTION INTRAVENOUS at 17:38

## 2020-01-01 RX ADMIN — METOPROLOL TARTRATE 12.5 MG: 25 TABLET, FILM COATED ORAL at 17:39

## 2020-01-01 RX ADMIN — Medication 10 ML: at 21:33

## 2020-01-01 RX ADMIN — LOSARTAN POTASSIUM 25 MG: 50 TABLET, FILM COATED ORAL at 08:21

## 2020-01-01 RX ADMIN — METFORMIN HYDROCHLORIDE 1000 MG: 500 TABLET ORAL at 17:39

## 2020-01-01 RX ADMIN — MORPHINE SULFATE 2 MG: 2 INJECTION, SOLUTION INTRAMUSCULAR; INTRAVENOUS at 05:05

## 2020-01-01 RX ADMIN — ATORVASTATIN CALCIUM 10 MG: 10 TABLET, FILM COATED ORAL at 09:10

## 2020-01-01 RX ADMIN — THERA TABS 1 TABLET: TAB at 09:10

## 2020-01-01 RX ADMIN — Medication 10 ML: at 14:00

## 2020-01-01 RX ADMIN — Medication 10 ML: at 06:31

## 2020-01-01 RX ADMIN — HYDROMORPHONE HYDROCHLORIDE 1 MG: 1 INJECTION, SOLUTION INTRAMUSCULAR; INTRAVENOUS; SUBCUTANEOUS at 20:47

## 2020-01-01 RX ADMIN — CYANOCOBALAMIN TAB 500 MCG 5000 MCG: 500 TAB at 07:53

## 2020-01-01 RX ADMIN — HEPARIN SODIUM 5000 UNITS: 5000 INJECTION INTRAVENOUS; SUBCUTANEOUS at 21:59

## 2020-01-01 RX ADMIN — TRAZODONE HYDROCHLORIDE 200 MG: 100 TABLET ORAL at 21:58

## 2020-01-01 RX ADMIN — LOSARTAN POTASSIUM 25 MG: 50 TABLET, FILM COATED ORAL at 10:14

## 2020-01-01 RX ADMIN — CLOPIDOGREL BISULFATE 75 MG: 75 TABLET ORAL at 08:20

## 2020-01-01 RX ADMIN — IPRATROPIUM BROMIDE AND ALBUTEROL SULFATE 3 ML: .5; 3 SOLUTION RESPIRATORY (INHALATION) at 04:59

## 2020-01-01 RX ADMIN — MORPHINE SULFATE 2 MG: 2 INJECTION, SOLUTION INTRAMUSCULAR; INTRAVENOUS at 19:49

## 2020-01-01 RX ADMIN — FERROUS SULFATE TAB 325 MG (65 MG ELEMENTAL FE) 325 MG: 325 (65 FE) TAB at 07:51

## 2020-01-01 RX ADMIN — PHENYLEPHRINE HYDROCHLORIDE 20 MCG/MIN: 10 INJECTION INTRAVENOUS at 17:10

## 2020-01-01 RX ADMIN — HEPARIN SODIUM 5000 UNITS: 5000 INJECTION INTRAVENOUS; SUBCUTANEOUS at 06:00

## 2020-01-01 RX ADMIN — SODIUM CHLORIDE: 900 INJECTION, SOLUTION INTRAVENOUS at 08:48

## 2020-01-01 RX ADMIN — ATORVASTATIN CALCIUM 10 MG: 10 TABLET, FILM COATED ORAL at 10:14

## 2020-01-01 RX ADMIN — MORPHINE SULFATE 2 MG: 2 INJECTION, SOLUTION INTRAMUSCULAR; INTRAVENOUS at 00:10

## 2020-01-01 RX ADMIN — MORPHINE SULFATE 2 MG: 2 INJECTION, SOLUTION INTRAMUSCULAR; INTRAVENOUS at 16:48

## 2020-01-01 RX ADMIN — THERA TABS 1 TABLET: TAB at 08:21

## 2020-01-01 RX ADMIN — HEPARIN SODIUM 5000 UNITS: 5000 INJECTION INTRAVENOUS; SUBCUTANEOUS at 19:49

## 2020-01-01 RX ADMIN — HUMAN INSULIN 1 UNITS: 100 INJECTION, SOLUTION SUBCUTANEOUS at 14:04

## 2020-01-01 RX ADMIN — CYANOCOBALAMIN TAB 500 MCG 5000 MCG: 500 TAB at 10:13

## 2020-01-01 RX ADMIN — PHENYLEPHRINE HYDROCHLORIDE 200 MCG: 10 INJECTION INTRAVENOUS at 16:57

## 2020-01-01 RX ADMIN — HYDROMORPHONE HYDROCHLORIDE 1 MG: 1 INJECTION, SOLUTION INTRAMUSCULAR; INTRAVENOUS; SUBCUTANEOUS at 21:00

## 2020-01-01 RX ADMIN — METFORMIN HYDROCHLORIDE 1000 MG: 500 TABLET ORAL at 17:36

## 2020-01-01 RX ADMIN — FENTANYL CITRATE 100 MCG: 50 INJECTION, SOLUTION INTRAMUSCULAR; INTRAVENOUS at 17:36

## 2020-01-01 RX ADMIN — GABAPENTIN 600 MG: 300 CAPSULE ORAL at 21:33

## 2020-01-01 RX ADMIN — VALACYCLOVIR HYDROCHLORIDE 500 MG: 500 TABLET, FILM COATED ORAL at 10:14

## 2020-01-01 RX ADMIN — SUCCINYLCHOLINE CHLORIDE 100 MG: 20 INJECTION, SOLUTION INTRAMUSCULAR; INTRAVENOUS; PARENTERAL at 16:35

## 2020-01-01 RX ADMIN — HUMAN INSULIN 1 UNITS: 100 INJECTION, SOLUTION SUBCUTANEOUS at 10:13

## 2020-01-01 RX ADMIN — Medication 10 ML: at 05:18

## 2020-01-01 RX ADMIN — METFORMIN HYDROCHLORIDE 1000 MG: 500 TABLET ORAL at 16:48

## 2020-01-01 RX ADMIN — METFORMIN HYDROCHLORIDE 1000 MG: 500 TABLET ORAL at 10:14

## 2020-01-01 RX ADMIN — PHENYLEPHRINE HYDROCHLORIDE 200 MCG: 10 INJECTION INTRAVENOUS at 17:11

## 2020-01-01 RX ADMIN — FENTANYL CITRATE 100 MCG: 50 INJECTION, SOLUTION INTRAMUSCULAR; INTRAVENOUS at 16:35

## 2020-01-01 RX ADMIN — INSULIN GLARGINE 18 UNITS: 100 INJECTION, SOLUTION SUBCUTANEOUS at 21:33

## 2020-01-01 RX ADMIN — FERROUS SULFATE TAB 325 MG (65 MG ELEMENTAL FE) 325 MG: 325 (65 FE) TAB at 08:21

## 2020-01-01 RX ADMIN — CYANOCOBALAMIN TAB 500 MCG 5000 MCG: 500 TAB at 09:10

## 2020-01-01 RX ADMIN — METOPROLOL TARTRATE 12.5 MG: 25 TABLET, FILM COATED ORAL at 10:14

## 2020-01-01 RX ADMIN — HEPARIN SODIUM 5000 UNITS: 1000 INJECTION, SOLUTION INTRAVENOUS; SUBCUTANEOUS at 18:33

## 2020-01-01 RX ADMIN — HEPARIN SODIUM 5000 UNITS: 5000 INJECTION INTRAVENOUS; SUBCUTANEOUS at 13:41

## 2020-01-01 RX ADMIN — IPRATROPIUM BROMIDE AND ALBUTEROL SULFATE 3 ML: .5; 3 SOLUTION RESPIRATORY (INHALATION) at 03:26

## 2020-01-01 RX ADMIN — VALACYCLOVIR HYDROCHLORIDE 500 MG: 500 TABLET, FILM COATED ORAL at 09:10

## 2020-01-01 RX ADMIN — Medication 10 ML: at 05:05

## 2020-01-01 RX ADMIN — MORPHINE SULFATE 2 MG: 2 INJECTION, SOLUTION INTRAMUSCULAR; INTRAVENOUS at 06:45

## 2020-01-01 RX ADMIN — HEPARIN SODIUM 5000 UNITS: 5000 INJECTION INTRAVENOUS; SUBCUTANEOUS at 06:40

## 2020-01-01 RX ADMIN — CLOPIDOGREL BISULFATE 75 MG: 75 TABLET ORAL at 10:15

## 2020-01-01 RX ADMIN — GABAPENTIN 300 MG: 300 CAPSULE ORAL at 06:40

## 2020-01-01 RX ADMIN — METOPROLOL TARTRATE 12.5 MG: 25 TABLET, FILM COATED ORAL at 21:59

## 2020-01-01 RX ADMIN — METOPROLOL TARTRATE 12.5 MG: 25 TABLET, FILM COATED ORAL at 08:20

## 2020-01-01 RX ADMIN — CLOPIDOGREL BISULFATE 75 MG: 75 TABLET ORAL at 09:14

## 2020-01-01 RX ADMIN — METFORMIN HYDROCHLORIDE 1000 MG: 500 TABLET ORAL at 08:21

## 2020-01-01 RX ADMIN — CLOPIDOGREL BISULFATE 75 MG: 75 TABLET ORAL at 07:51

## 2020-01-01 RX ADMIN — OXYCODONE HYDROCHLORIDE AND ACETAMINOPHEN 1 TABLET: 5; 325 TABLET ORAL at 05:19

## 2020-01-01 RX ADMIN — ATORVASTATIN CALCIUM 10 MG: 10 TABLET, FILM COATED ORAL at 07:53

## 2020-01-01 RX ADMIN — HEPARIN SODIUM 5000 UNITS: 5000 INJECTION INTRAVENOUS; SUBCUTANEOUS at 14:04

## 2020-01-01 RX ADMIN — LOSARTAN POTASSIUM 25 MG: 50 TABLET, FILM COATED ORAL at 07:52

## 2020-01-01 RX ADMIN — FERROUS SULFATE TAB 325 MG (65 MG ELEMENTAL FE) 325 MG: 325 (65 FE) TAB at 10:14

## 2020-01-01 RX ADMIN — TRAZODONE HYDROCHLORIDE 200 MG: 100 TABLET ORAL at 19:49

## 2020-01-01 RX ADMIN — ONDANSETRON HYDROCHLORIDE 4 MG: 2 SOLUTION INTRAMUSCULAR; INTRAVENOUS at 19:55

## 2020-01-01 RX ADMIN — PHENYLEPHRINE HYDROCHLORIDE 200 MCG: 10 INJECTION INTRAVENOUS at 16:44

## 2020-01-01 RX ADMIN — GABAPENTIN 600 MG: 300 CAPSULE ORAL at 19:49

## 2020-01-01 RX ADMIN — LOPERAMIDE HYDROCHLORIDE 2 MG: 2 CAPSULE ORAL at 05:06

## 2020-01-01 RX ADMIN — MORPHINE SULFATE 2 MG: 2 INJECTION, SOLUTION INTRAMUSCULAR; INTRAVENOUS at 10:13

## 2020-01-01 RX ADMIN — GABAPENTIN 600 MG: 300 CAPSULE ORAL at 21:50

## 2020-01-01 RX ADMIN — SODIUM CHLORIDE 0.2 G: 900 INJECTION, SOLUTION INTRAVENOUS at 15:09

## 2020-01-01 RX ADMIN — SODIUM CHLORIDE, SODIUM LACTATE, POTASSIUM CHLORIDE, AND CALCIUM CHLORIDE: 600; 310; 30; 20 INJECTION, SOLUTION INTRAVENOUS at 18:57

## 2020-01-01 RX ADMIN — MORPHINE SULFATE 2 MG: 2 INJECTION, SOLUTION INTRAMUSCULAR; INTRAVENOUS at 18:34

## 2020-01-01 RX ADMIN — METOPROLOL TARTRATE 12.5 MG: 25 TABLET, FILM COATED ORAL at 09:11

## 2020-01-01 RX ADMIN — POTASSIUM & SODIUM PHOSPHATES POWDER PACK 280-160-250 MG 2 PACKET: 280-160-250 PACK at 09:10

## 2020-01-01 RX ADMIN — TRAZODONE HYDROCHLORIDE 200 MG: 100 TABLET ORAL at 21:50

## 2020-01-01 RX ADMIN — HYDROMORPHONE HYDROCHLORIDE 0.2 MG: 2 INJECTION INTRAMUSCULAR; INTRAVENOUS; SUBCUTANEOUS at 20:21

## 2020-01-01 RX ADMIN — PHENYLEPHRINE HYDROCHLORIDE 100 MCG: 10 INJECTION INTRAVENOUS at 16:35

## 2020-01-01 RX ADMIN — THERA TABS 1 TABLET: TAB at 10:14

## 2020-01-01 RX ADMIN — CYANOCOBALAMIN TAB 500 MCG 5000 MCG: 500 TAB at 08:20

## 2020-01-01 RX ADMIN — HEPARIN SODIUM 5000 UNITS: 5000 INJECTION INTRAVENOUS; SUBCUTANEOUS at 05:05

## 2020-01-01 RX ADMIN — IPRATROPIUM BROMIDE AND ALBUTEROL SULFATE 3 ML: .5; 3 SOLUTION RESPIRATORY (INHALATION) at 15:30

## 2020-01-01 RX ADMIN — MORPHINE SULFATE 2 MG: 2 INJECTION, SOLUTION INTRAMUSCULAR; INTRAVENOUS at 10:40

## 2020-01-01 RX ADMIN — LOSARTAN POTASSIUM 25 MG: 50 TABLET, FILM COATED ORAL at 09:10

## 2020-01-01 RX ADMIN — ROCURONIUM BROMIDE 5 MG: 10 INJECTION, SOLUTION INTRAVENOUS at 16:35

## 2020-01-01 RX ADMIN — INSULIN GLARGINE 18 UNITS: 100 INJECTION, SOLUTION SUBCUTANEOUS at 22:26

## 2020-01-01 RX ADMIN — DIPHENHYDRAMINE HYDROCHLORIDE 25 MG: 25 CAPSULE ORAL at 18:34

## 2020-01-01 RX ADMIN — METFORMIN HYDROCHLORIDE 1000 MG: 500 TABLET ORAL at 07:50

## 2020-01-01 RX ADMIN — Medication 10 ML: at 19:49

## 2020-01-01 RX ADMIN — HYDROMORPHONE HYDROCHLORIDE 0.4 MG: 2 INJECTION INTRAMUSCULAR; INTRAVENOUS; SUBCUTANEOUS at 18:59

## 2020-01-01 RX ADMIN — MORPHINE SULFATE 2 MG: 2 INJECTION, SOLUTION INTRAMUSCULAR; INTRAVENOUS at 19:05

## 2020-01-01 RX ADMIN — GABAPENTIN 600 MG: 300 CAPSULE ORAL at 21:59

## 2020-01-01 RX ADMIN — IPRATROPIUM BROMIDE AND ALBUTEROL SULFATE 3 ML: .5; 3 SOLUTION RESPIRATORY (INHALATION) at 23:09

## 2020-01-01 RX ADMIN — HYDROMORPHONE HYDROCHLORIDE 1 MG: 2 INJECTION, SOLUTION INTRAMUSCULAR; INTRAVENOUS; SUBCUTANEOUS at 15:26

## 2020-01-01 RX ADMIN — THERA TABS 1 TABLET: TAB at 07:51

## 2020-01-01 RX ADMIN — MORPHINE SULFATE 2 MG: 2 INJECTION, SOLUTION INTRAMUSCULAR; INTRAVENOUS at 06:40

## 2020-01-01 RX ADMIN — PROPOFOL 150 MG: 10 INJECTION, EMULSION INTRAVENOUS at 16:35

## 2020-01-01 RX ADMIN — HEPARIN SODIUM 5000 UNITS: 5000 INJECTION INTRAVENOUS; SUBCUTANEOUS at 21:50

## 2020-01-01 RX ADMIN — IPRATROPIUM BROMIDE AND ALBUTEROL SULFATE 3 ML: .5; 3 SOLUTION RESPIRATORY (INHALATION) at 07:27

## 2020-01-01 RX ADMIN — METOPROLOL TARTRATE: 25 TABLET, FILM COATED ORAL at 07:52

## 2020-01-01 RX ADMIN — GABAPENTIN 300 MG: 300 CAPSULE ORAL at 06:31

## 2020-01-01 RX ADMIN — HEPARIN SODIUM 5000 UNITS: 5000 INJECTION INTRAVENOUS; SUBCUTANEOUS at 05:18

## 2020-01-01 RX ADMIN — Medication 10 ML: at 21:51

## 2020-01-01 RX ADMIN — MORPHINE SULFATE 2 MG: 2 INJECTION, SOLUTION INTRAMUSCULAR; INTRAVENOUS at 21:50

## 2020-01-01 RX ADMIN — MIDAZOLAM HYDROCHLORIDE 2 MG: 2 INJECTION, SOLUTION INTRAMUSCULAR; INTRAVENOUS at 16:26

## 2020-01-01 RX ADMIN — MORPHINE SULFATE 2 MG: 2 INJECTION, SOLUTION INTRAMUSCULAR; INTRAVENOUS at 17:39

## 2020-01-01 RX ADMIN — MORPHINE SULFATE 2 MG: 2 INJECTION, SOLUTION INTRAMUSCULAR; INTRAVENOUS at 03:48

## 2020-01-01 RX ADMIN — VALACYCLOVIR HYDROCHLORIDE 500 MG: 500 TABLET, FILM COATED ORAL at 07:50

## 2020-01-01 RX ADMIN — TRAZODONE HYDROCHLORIDE 200 MG: 100 TABLET ORAL at 21:33

## 2020-01-01 RX ADMIN — VALACYCLOVIR HYDROCHLORIDE 500 MG: 500 TABLET, FILM COATED ORAL at 08:21

## 2020-01-01 RX ADMIN — OXYCODONE HYDROCHLORIDE AND ACETAMINOPHEN 1 TABLET: 5; 325 TABLET ORAL at 13:41

## 2020-01-01 RX ADMIN — HYDROMORPHONE HYDROCHLORIDE 1 MG: 2 INJECTION, SOLUTION INTRAMUSCULAR; INTRAVENOUS; SUBCUTANEOUS at 01:43

## 2020-01-01 RX ADMIN — HUMAN INSULIN 1 UNITS: 100 INJECTION, SOLUTION SUBCUTANEOUS at 17:36

## 2020-01-01 RX ADMIN — PROPOFOL 50 MG: 10 INJECTION, EMULSION INTRAVENOUS at 17:21

## 2020-01-01 RX ADMIN — SODIUM CHLORIDE 75 ML/HR: 900 INJECTION, SOLUTION INTRAVENOUS at 05:18

## 2020-01-01 RX ADMIN — METOPROLOL TARTRATE 12.5 MG: 25 TABLET, FILM COATED ORAL at 16:48

## 2020-01-01 RX ADMIN — Medication 10 ML: at 22:00

## 2020-02-13 ENCOUNTER — OP HISTORICAL/CONVERTED ENCOUNTER (OUTPATIENT)
Dept: OTHER | Age: 65
End: 2020-02-13

## 2020-02-27 RX ORDER — INSULIN LISPRO 100 [IU]/ML
INJECTION, SOLUTION INTRAVENOUS; SUBCUTANEOUS
Qty: 1 VIAL | Refills: 0 | Status: SHIPPED | COMMUNITY
Start: 2020-02-27 | End: 2020-01-01

## 2020-05-29 NOTE — PATIENT INSTRUCTIONS
Pump -- humalog  2 vials a month ( used 50 units a day ) Continue  neurontin 300 mg AM,    and  600 mg at bed time Check blood sugars immediately before each meal and at bedtime Start on ozempic 0.25 mg once a week   And increase    To 0.5 mg  A week after 3 weeks   ( 90days  Supply ) Pt stopped  metformin 
jardiance 25 mg a day before b-fast  
 
 
 
 
BACK UP :  
 
Take Levemir insulin 20 units at bed time Take  humalog   insulin 3 units before breakfast, 3 units before lunch and 5 units before dinner. Also, add additional humalog    as follows with meals  If blood sugars are[de-identified] 
 
150-200 mg 1 units 201-250 mg 2 units 251-300 mg 3 units 301-350 mg 4 units 351-400 mg 5 units 401-450 mg 6 units 451-500 mg 7 units Less than 70 mg NO INSULIN 
 
 
 
No grapes, oranges, peaches and pineapple Low on starches-- carbs, pasta rice and potatoes No snacks in between meal times

## 2020-05-29 NOTE — PROGRESS NOTES
1. Have you been to the ER, urgent care clinic since your last visit? No  Hospitalized since your last visit? No 
 
2. Have you seen or consulted any other health care providers outside of the [address] [place] [address] since your last visit? Include any pap smears or colon screening.  No

## 2020-05-29 NOTE — PROGRESS NOTES
**THIS IS A VIRTUAL VISIT VIA AUDIO- VIDEO SYNCHRONOUS DISCUSSION. PATIENT AGREED TO HAVE THEIR CARE DELIVERED OVER A [employer] ME VIDEO VISIT IN PLACE OF THEIR REGULARLY SCHEDULED OFFICE VISIT** Pt  is aware that this is a billable encounter and is responsible for copays/deductibles Patient gave a verbal consent to proceed with virtual video visit Patient is at home and I, the provider,  am at the office care diabetes and endocrinology HISTORY OF PRESENT ILLNESS Barbara Lainez is a 59 y.o. female. HPI Patient is here for f/u visit of Type 2 diabetes mellitus after November 2019 She did not like changing to  humalog from [employer] She is not using sensors of 670 G She does not like checking sugars She lost her transmitter I believe She is not punching numbers into pump She had foot surgery in feb 2020 Old history :  
 
She  Lost sensor  And she is not using the medtronic pump 670 G the right manner She appears frustrated and angry these times ( noticed) She wants Ozempic Old history : 
 
Missed an appt She got [employer]  ThromboVision Pt expressed concern with  Being jardiance LOST 10 lbs She had no labs C/o right great toe pain 8/10 pain ( screw in it ) Old history :  
She got started on insulin pump May 28 2015 She got Throm[employer]ision She thought that her blood sugars will get transferred to  The pump automatically So her download has no blood sugar checks Old history Lost  7  lbs She had hammer toe surgery  ( great toe )  On right side , a month ago Slow healing She developed a blister on right lateral aspect, got worried about it  
accompanied by /friend Old history She saw the vascular surgeon, Dr. Brittaney Finn, She has clogged artery on right leg Going for stent put in She is doing better with checks She is smoking She got off tegretol She is f/u Dr Jhonny Castellanos She has good log and not many low sugars Prior history . H/o DM 2 for 17 years Current A1C is 12 % and symptoms/problems include fluctuating blood sugars Current diabetic medications include Lantus 15 units and glucovance Current monitoring regimen: home blood tests - 2 times daily Home blood sugar records: trend: fluctuating a lot Any episodes of hypoglycemia? no  
 
 
 
 
 
 
 
 
 
Review of Systems Constitutional: Negative. HENT: Negative. Eyes: Negative for pain and redness. Respiratory: Negative. Cardiovascular: Negative for chest pain, palpitations and leg swelling. Gastrointestinal: Negative. Negative for constipation. Genitourinary: Negative. Musculoskeletal: Negative for myalgias. Skin: Negative. Neurological: Negative. Endo/Heme/Allergies: Negative. Psychiatric/Behavioral: has bipolar symptoms Physical Exam  
Constitutional: oriented to person, place, and time. appears well-developed and well-nourished. HENT:  
Head: Normocephalic. Eyes: normal , noted no swelling or redness. Neck: Normal range of motion. Cardiovascular: could nto be examined Pulmonary/Chest: appears breathing effortlessly Abdominal: Soft. Musculoskeletal: appears relatively nprmal  range of motion. Neurological: He is alert and oriented to person, place, and time. Appears to have no focal deficits Psychiatric: He has a normal mood and affect. Lab Results Component Value Date/Time Hemoglobin A1c 8.9 (H) 10/28/2019 08:42 AM  
 Hemoglobin A1c 8.8 (H) 07/02/2019 10:51 AM  
 Hemoglobin A1c 8.1 (H) 01/30/2019 09:15 AM  
 Glucose 169 (H) 10/28/2019 08:42 AM  
 Glucose (POC) 158 (H) 03/15/2013 11:14 AM  
 Glucose  02/14/2018 12:02 PM  
 Microalb/Creat ratio (ug/mg creat.) 121.0 (H) 10/28/2019 08:42 AM  
 LDL, calculated 76 10/28/2019 08:42 AM  
 Creatinine 0.89 10/28/2019 08:42 AM  
  
Lab Results Component Value Date/Time Cholesterol, total 149 10/28/2019 08:42 AM  
 HDL Cholesterol 53 10/28/2019 08:42 AM  
 LDL, calculated 76 10/28/2019 08:42 AM  
 Triglyceride 100 10/28/2019 08:42 AM  
 
Lab Results Component Value Date/Time ALT (SGPT) 36 (H) 10/28/2019 08:42 AM  
 Alk. phosphatase 112 10/28/2019 08:42 AM  
 Bilirubin, total 0.4 10/28/2019 08:42 AM  
 Albumin 4.1 10/28/2019 08:42 AM  
 Protein, total 6.9 10/28/2019 08:42 AM  
 PLATELET 644 73/83/2380 09:15 AM  
 
Lab Results Component Value Date/Time GFR est non-AA 69 10/28/2019 08:42 AM  
 GFR est AA 79 10/28/2019 08:42 AM  
 Creatinine 0.89 10/28/2019 08:42 AM  
 BUN 14 10/28/2019 08:42 AM  
 Sodium 144 10/28/2019 08:42 AM  
 Potassium 4.3 10/28/2019 08:42 AM  
 Chloride 103 10/28/2019 08:42 AM  
 CO2 24 10/28/2019 08:42 AM  
 
 
 
  
 
ASSESSMENT and PLAN 1. Type 2 DM, un controlled with type 1 behaviour  : A1c is   N/a   Compared to   8.9 %    From    Nov 2019   compared to  7.8 %   From oct 2018       Compared to   8.2 %    From  May 2018    Compared to      9 %      From     Feb 2018   Compared to  7.7 %     From    Today May 2017    compared to   8.2 %    From  Feb 2017   Compared to     7.2 %    From today aug 2016  Compared to  7.7 %     From   May 2016  Compared to  6.3 %      From      Sept 2015   Compared to   7.7 %  From  May 2015 ;    9.2 %    From feb 2015  Compared to   9.1 %    From oct 2014  compared to  9.1 % from May 2014 May 2020 More pleasant in attitude Unable to take metformin On jardiance Started  ON OZEMPIC per patient's multiple requests, and I explained to her the side effects being nausea  And adverse effects being pancreatitis ON MEDTRONIC PUMP 670 G , but not able to  Utilize it well as she somehow lost the transmitter which is given only once a year She can get another in July 2020 She does not check and she does not bolus She fear to bolus even though she eats as she recognized gaining weight from taking matching insulin to her carb consumption Nov 2019 : 
 
Glycemic control is being lost as she is not using the pump at all  
 on the pump - started in may 2015, on older version She is told to document the numbers on pump from [employer] And she should be able to control easily Increase   checks and intake of insulin, not entering carbs at all ( notieceable again)  In the past  
She is afraid that she would gain weight if she carbs correctly Reviewed pump download , no info seen She always look lost in recent visits. .. plus keeps looking for more weight loss I could not start on ozempic unless she is supervised by [employer]   cde that she is versed with pump use Continue jardiance  -- but its role is questionable and she decided to stay on it . She enjoyed the weight loss from being on Jardiance and having to use less insulin and even though she is the one who brought up the concern regarding the amputations reported on this class of medications, patient herself again insists that she does not want to go off the medication even though I have strictly advised her not to be on this medication. I have provided the information for amputation possibilities despite which she did not want to agree to stop the [place] Continue on metformin Behaves like type 1, but her c-pep is positive AMANDA negative   
 
advised about checking blood sugars 4 times a day and maintaining log book. 2. Hypoglycemia : provided the education 3. HTN : on cozaar   , Proteinuria resolved Lisinopril stopped for cough 4. Dyslipidemia : continue zocor. Patient is educated about benefits and adverse effects of statins and explained how benefits outweigh risk. 5. use of aspirin to prevent MI and TIA's discussed 6. On estrogen Requesting refills on diflucan 7. Neuropathy :  She says she has no relief On  gabapentin to 300 AM  and 600 hs ( from 100, 100, 300 mg   Dosing) Gabapentin is considered as a scheduled substance starting July 1 and I am not sure if that is obtainable for the patient Stopped  tramadol 50mg 8. Depression : she is no longer f/u with Dr. Scott Hector 10. PAD - Right great toe  surgery , f/u with Dr. Cedrick oFster  
 she has pain constant in the leg She is a smoker Got Checked out vascular status by Dr. Darby Casanova , she had angioplasties  Bilaterally She has the hammer toe surgery a month ago , slow healing , she has hardware in place 11 . CAD  :  Pt had stent placed Pt f/u with Dr. Christensen Root Was resistant to take jardiance for quite some time Pursuant  To the Emergency declaration under the [employer] and the [facility] waiver authority and the [facility], to reduce the patient's risk of exposure to  COVID-19 and provide continuity of care for an established patient.

## 2020-07-01 NOTE — PERIOP NOTES
[address]                  
[address] [facility], [address] [facility] MAIN OR                                  [phone] MAIN PRE OP                          [phone]                                                                                AMBULATORY PRE OP          [phone] PRE-ADMISSION TESTING    21  Surgery Date:  Thursday, July 9, 2020* Is surgery arrival time given by surgeon? NO If Orion Pineda staff will call you between 3 and 7pm the day before your surgery with your arrival time. (If your surgery is on a Monday, we will call you the Friday before.) Call [phone] after 7pm Monday-Friday if you did not receive this call. INSTRUCTIONS BEFORE YOUR SURGERY When You 
Arrive Arrive at the 2nd [address] [facility] on the day of your surgery Have your insurance card, photo ID, and any copayment (if needed) Food 
 and  
Drink NO food or drink after midnight the night before surgery This means NO water, gum, mints, coffee, juice, etc. 
No alcohol (beer, wine, liquor) 24 hours before and after surgery Medications to TAKE Morning of Surgery MEDICATIONS TO TAKE THE MORNING OF SURGERY WITH A SIP OF WATER:  
Metoprolol Trelegy Gabapentin Meclizine if needed Albuterol if needed Tramadol if needed Medications To 
STOP      7 days before surgery ? Non-Steroidal anti-inflammatory Drugs (NSAID's): for example, Ibuprofen (Advil, Motrin), Naproxen (Aleve) ? Aspirin, if taking for pain ? Herbal supplements, vitamins, and fish oil 
? Other:fish oil, estroven, multivitamin, cranberry, turmeric, biotin 
(Pain medications not listed above, including Tylenol may be taken) Do not take your losartan the day of surgery Please bring your inhalers with you the day of surgery. Blood Thinners ?  If you take  Aspirin, Plavix, Coumadin, or any blood-thinning or anti-blood clot medicine, talk to the doctor who prescribed the medications for pre-operative instructions. ? Stop Plavix 2 days prior to surgery; Continue taking aspirin  Per Dr. Joseph Hassan orders. Bathing Clothing Jewelry Valuables ? If you shower the morning of surgery, please do not apply anything to your skin (lotions, powders, deodorant, or makeup, especially mascara) ? Follow Chlorhexidine Care Fusion body wash instructions provided to you during PAT appointment. Begin 3 days prior to surgery. ? Do not shave or trim anywhere 24 hours before surgery ? Wear your hair loose or down; no pony-tails, buns, or metal hair clips ? Wear loose, comfortable, clean clothes ? Wear glasses instead of contacts ? Leave money, valuables, and jewelry, including body piercings, at home Going Home - or Spending the Night ? SAME-DAY SURGERY: You must have a responsible adult drive you home and stay with you 24 hours after surgery ? ADMITS: If your doctor is keeping you in the hospital after surgery, leave personal belongings/luggage in your car until you have a hospital room number. Hospital discharge time is 12 noon Drivers must be here before 12 noon unless you are told differently You may have one visitor with you the day of surgery per current hospital protocol. Special Instructions It is now mandated that all surgical patients be tested for COVID-19 prior to surgery. Testing has to be exactly 4 days prior to surgery. Your COVID test date is [place], July 5, 2020 between 8:00 am and 11:00 am.  
 
 
COVID testing will be performed curbside at the [facility] [address]. There will be signs leading you to the testing site. You will need to bring a photo ID with you to be swabbed. Patients are advised to self-quarantine at home after testing and prior to your surgery date. You will be notified if your results are positive. What to watch for: ? Coronavirus (COVID-19) affects different people in different ways ? It also appears with a wide range of symptoms from mild to severe ? Signs usually appear 2-14 days after exposure ? If you develop any of the following, notify your doctor immediately: 
o Fever 
o Chills, with or without a shiver 
o Muscle pain 
o Headache 
o Sore throat 
o Dry cough 
o New loss of taste or smell 
o Tiredness ? If you develop any of the following, call 911: 
o Shortness of breath 
o Difficulty breathing 
o Chest pain 
o New confusion 
o Blueness of fingers and/or lips Diabetic patients: 
Please call the MD who prescribes your insulin for instructions on what to do with your insulin the night before surgery. If allowed by your surgeon, eat a good protein snack before midnight the night before your surgery DO NOT TAKE ANY DIABETIC MEDICINE THE MORNING OF SURGERY Check your blood sugar the morning of surgery and if it is low you may use glucose tabs Follow all instructions so your surgery wont be cancelled. Please, be on time. If a situation occurs and you are delayed the day of surgery, call [phone]. If your physical condition changes (like a fever, cold, flu, etc.) call your surgeon. Home medication(s) reviewed and verified with LIST and  VERBALLY   during PAT appointment. The patient was contacted by IN-PERSON The patient verbalizes understanding of all instructions and   DOES   DOES NOT   need reinforcement.

## 2020-07-07 NOTE — PERIOP NOTES
Left a  for inpatient DTC, notifying them that the patient has an insulin pump and will be admitted after surgery on 7/9/2020. DOS: 7/9/2020

## 2020-07-08 NOTE — TELEPHONE ENCOUNTER
She has to lower her basal insulin settings by 40 %  - unsure if she could do it herself or not  ?      If not,   She can  Use the back up regimen instead of pump which is much easier      Take Levemir insulin 16 units at bed time , that is tonight      No fixed doses  Of humalog      Take  humalog  before  meals  If blood sugars are[de-identified]     150-200 mg 1 units     201-250 mg 2 units     251-300 mg 3 units     301-350 mg 4 units     351-400 mg 5 units     401-450 mg 6 units     451-500 mg 7 units     Less than 70 mg NO INSULIN

## 2020-07-08 NOTE — PERIOP NOTES
The H&P received from Dr. Joseph Hassan office is not signed. Spoke to Charity Pineda at Dr. Joseph Hassan office requesting a signed copy. Per Charity Pineda, the H&P still has not been signed by Dr. Aditi Fairbanks. Placed on note on the front of the patient's paper chart requesting that Dr. Aditi Fairbanks sign the H&P on the day of surgery. DOS: 7/9/2020

## 2020-07-08 NOTE — TELEPHONE ENCOUNTER
Pt is scheduled for tibial bypass graft surgery tomorrow  Must be NPO prior   Please advise on how to take meds

## 2020-07-09 PROBLEM — I73.9 PVD (PERIPHERAL VASCULAR DISEASE) (HCC): Status: ACTIVE | Noted: 2020-01-01

## 2020-07-09 NOTE — ANESTHESIA PREPROCEDURE EVALUATION
Relevant Problems No relevant active problems Anesthetic History No history of anesthetic complications Review of Systems / Medical History Patient summary reviewed and pertinent labs reviewed Pulmonary Within defined limits Neuro/Psych Psychiatric history (anxiety / depression) Cardiovascular Hypertension Dysrhythmias CAD Comments: Significant (75%) Right Carotid Artery Stenosis. Lower Limb peripheral vascular disease. ECG suggestive of old septal infarct. Held plavix since 7/6/20. GI/Hepatic/Renal 
Within defined limits Endo/Other Diabetes: well controlled, type 2, using insulin Other Findings Physical Exam 
 
Airway Mallampati: II 
TM Distance: 4 - 6 cm Mouth opening: Normal 
 
 Cardiovascular Rhythm: regular Rate: normal 
 
 
 
 Dental 
 
 
  
Pulmonary Breath sounds clear to auscultation Abdominal 
GI exam deferred Other Findings Anesthetic Plan ASA: 3 Anesthesia type: general 
 
 
 
 
Induction: Intravenous Anesthetic plan and risks discussed with: Patient

## 2020-07-09 NOTE — PERIOP NOTES
Floseal hemostatic matrix, to surgical field for application by . REF: [id number], lot [id number], exp. 2021-11-20.

## 2020-07-10 NOTE — BRIEF OP NOTE
Brief Postoperative Note Patient: Jostin Chavira YOB: 1955 MRN: [id number] Date of Procedure: 7/9/2020 Pre-Op Diagnosis: PERIPHERAL VASCULAR DISEASE WITH REST PAIN Post-Op Diagnosis: Same as preoperative diagnosis. Procedure(s): RIGHT FEMORAL-TIBIAL BYPASS WITH VEIN Surgeon(s): Danny Flynn MD 
 
Surgical Assistant: None Anesthesia: General  
 
Estimated Blood Loss (mL): 200 Complications: None Specimens: * No specimens in log * Implants:  
Implant Name Type Inv. Item Serial No.  Lot No. LRB No. Used Action GRAFT VASC BYPS [id number] -- DISTAFLOW - SNA  GRAFT VASC BYPS [id number] -- DISTAFLOW NA BARD PERIPHERAL VASCULAR [id number] Right 1 Implanted Drains: * No LDAs found * Findings: 0 Electronically Signed by Carolina Milton MD on 7/9/2020 at 8:04 PM

## 2020-07-10 NOTE — ANESTHESIA POSTPROCEDURE EVALUATION
Procedure(s): RIGHT FEMORAL-TIBIAL BYPASS WITH VEIN. general 
 
Anesthesia Post Evaluation Multimodal analgesia: multimodal analgesia not used between 6 hours prior to anesthesia start to PACU discharge Patient location during evaluation: PACU Patient participation: complete - patient participated Level of consciousness: awake Pain management: adequate Airway patency: patent Anesthetic complications: no 
Cardiovascular status: acceptable, blood pressure returned to baseline and hemodynamically stable Respiratory status: acceptable Hydration status: acceptable Post anesthesia nausea and vomiting:  controlled INITIAL Post-op Vital signs:  
Vitals Value Taken Time /56 7/9/2020  9:15 PM  
Temp 36.4 °C (97.6 °F) 7/9/2020  8:38 PM  
Pulse 80 7/9/2020  9:18 PM  
Resp 9 7/9/2020  9:18 PM  
SpO2 98 % 7/9/2020  9:18 PM  
Vitals shown include unvalidated device data.

## 2020-07-10 NOTE — PROGRESS NOTES
Problem: Mobility Impaired (Adult and Pediatric) Goal: *Acute Goals and Plan of Care (Insert Text) Description: FUNCTIONAL STATUS PRIOR TO ADMISSION: Patient was independent and active without use of DME. 
 
HOME SUPPORT PRIOR TO ADMISSION: The patient lived with her supportive  but did not require assist. 
 
Physical Therapy Goals Initiated 7/10/2020 1. Patient will move from supine to sit and sit to supine  in bed with modified independence within 7 day(s). 2.  Patient will transfer from bed to chair and chair to bed with modified independence using the least restrictive device within 7 day(s). 3.  Patient will perform sit to stand with modified independence within 7 day(s). 4.  Patient will ambulate with modified independence for 100 feet with the least restrictive device within 7 day(s). 5.  Patient will ascend/descend 4 stairs with 1 handrail(s) with modified independence within 7 day(s). Outcome: Progressing Towards Goal 
 
PHYSICAL THERAPY EVALUATION Patient: Magdiel Mark (30 y.o. female) Date: 7/10/2020 Primary Diagnosis: PVD (peripheral vascular disease) ([facility] [address] [I73.9] Procedure(s) (LRB): 
RIGHT FEMORAL-TIBIAL BYPASS WITH VEIN (Right) 1 Day Post-Op Precautions:     
 
 
ASSESSMENT Based on the objective data described below, the patient presents with c/o severe pain, impairing balance, endurance, and safety with functional mobility following admission for a right femoral-tibial bypass. She c/o severe pain with activity limiting WB tolerance and gait. Gait limited to ~6' using a RW and requiring moderate assist. Cues required for sequencing and the patient presented with partial buckling with full right WB. Vitals remained stable with activity and patient transferred to a new bed before moving to a new room. The patient's supportive spouse was present throughout and will be available in addition to a daughter.  Anticipate progress will improve as her pain subsides. Recommend home with a RW and potentially HHPT if mobility does not improve. Current Level of Function Impacting Discharge (mobility/balance): moderate assist for gait, pain limited Patient will benefit from skilled therapy intervention to address the above noted impairments. PLAN : 
Recommendations and Planned Interventions: bed mobility training, transfer training, gait training and therapeutic exercises Frequency/Duration: Patient will be followed by physical therapy:  5 times a week to address goals. Recommendation for discharge: (in order for the patient to meet his/her long term goals) Physical therapy at least 2 days/week in the home IF patient discharges home will need the following DME: rolling walker SUBJECTIVE:  
Patient stated I don't know how far I'll get. It really hurts.  OBJECTIVE DATA SUMMARY:  
HISTORY:   
Past Medical History:  
Diagnosis Date  Arrhythmia  CAD (coronary artery disease) 2018  
 2  cardiac stents  Depression  DM (diabetes mellitus) ([facility] [address] TYPE II  
 Gastroparesis 01/2014  PAD (peripheral artery disease) ([facility] [address] 01/2015  Thromboembolus ([facility] [address] 1993 RT. LEG Past Surgical History:  
Procedure Laterality Date  HX ARTHROPLASTY  HX BREAST REDUCTION Bilateral 03/2013  HX BUNIONECTOMY  HX GI  2012, 2019 COLONOSCOPY  
 HX LAP CHOLECYSTECTOMY  2010  HX ORTHOPAEDIC Right 02/07/2020  
 hardware removal and bone spur right foot  HX OSTEOTOMY  HX OTHER SURGICAL    
 right foot surgery X2 hammertoe surgery  HX OTHER SURGICAL  12/2016  
 foot surgery  REMOVAL GALLBLADDER Personal factors and/or comorbidities impacting plan of care:  
 
Home Situation Home Environment: Private residence # Steps to Enter: 6 Rails to Enter: Yes Hand Rails : Bilateral 
One/Two Story Residence: One story Living Alone: No 
 Support Systems: Spouse/Significant Other/Partner(daughter also available) Patient Expects to be Discharged to[de-identified] Private residence Current DME Used/Available at Home: Cane, straight, Crutches(insulin pump) EXAMINATION/PRESENTATION/DECISION MAKING:  
Critical Behavior: 
Neurologic State: Alert Orientation Level: Appropriate for age, Oriented X4 Cognition: Appropriate decision making, Appropriate for age attention/concentration, Appropriate safety awareness, Follows commands Hearing: Auditory Auditory Impairment: None Skin:   
Edema:  
Range Of Motion: 
AROM: Generally decreased, functional 
  
  
  
  
  
  
  
Strength:   
Strength: Within functional limits Tone & Sensation:  
Tone: Normal 
  
  
  
  
  
  
  
  
   
Coordination: 
Coordination: Within functional limits Vision:  
  
Functional Mobility: 
Bed Mobility: 
Rolling: Supervision Supine to Sit: Contact guard assistance Scooting: Contact guard assistance Transfers: 
Sit to Stand: Moderate assistance; Additional time Stand to Sit: Minimum assistance Balance:  
Sitting: Intact Standing: Impaired Ambulation/Gait Training: 
Distance (ft): 8 Feet (ft) Assistive Device: Gait belt;Walker, rolling Ambulation - Level of Assistance: Moderate assistance Gait Description (WDL): Exceptions to [facility] Gait Abnormalities: Shuffling gait; Antalgic Base of Support: Narrowed Stance: Right decreased Speed/Eboni: Pace decreased (<100 feet/min) Step Length: Left shortened Physical Therapy Evaluation Charge Determination History Examination Presentation Decision-Making MEDIUM  Complexity : 1-2 comorbidities / personal factors will impact the outcome/ POC  MEDIUM Complexity : 3 Standardized tests and measures addressing body structure, function, activity limitation and / or participation in recreation  MEDIUM Complexity : Evolving with changing characteristics  MEDIUM Complexity : FOTO score of 26-74 Based on the above components, the patient evaluation is determined to be of the following complexity level: MEDIUM Pain Ratin/10  In right surgical site with activity Activity Tolerance:  
Poor Please refer to the flowsheet for vital signs taken during this treatment. After treatment patient left in no apparent distress:  
Supine in bed COMMUNICATION/EDUCATION:  
The patients plan of care was discussed with: Registered nurse. Fall prevention education was provided and the patient/caregiver indicated understanding., Patient/family have participated as able in goal setting and plan of care. and Patient/family agree to work toward stated goals and plan of care. Thank you for this referral. 
Britt Omer, PT, DPT Time Calculation: 28 mins

## 2020-07-10 NOTE — PROGRESS NOTES
Problem: Patient Education: Go to Patient Education Activity Goal: Patient/Family Education Outcome: Progressing Towards Goal 
  
Problem: Surgical Pathway Day of Surgery Goal: Off Pathway (Use only if patient is Off Pathway) Outcome: Progressing Towards Goal 
Goal: Activity/Safety Outcome: Progressing Towards Goal 
Goal: Consults, if ordered Outcome: Progressing Towards Goal 
Goal: Medications Outcome: Progressing Towards Goal 
Goal: Respiratory Outcome: Progressing Towards Goal 
Variance Patient Condition Impact: Moderate Goal: Treatments/Interventions/Procedures Outcome: Progressing Towards Goal 
Goal: Psychosocial 
Outcome: Progressing Towards Goal 
Goal: *No signs and symptoms of infection or wound complications Outcome: Progressing Towards Goal 
Goal: *Optimal pain control at patient's stated goal 
Outcome: Progressing Towards Goal 
Goal: *Adequate urinary output (equal to or greater than 30 milliliters/hour) Description: Ambulatory Surgery patients voiding without difficulty. Outcome: Progressing Towards Goal 
Goal: *Hemodynamically stable Outcome: Progressing Towards Goal 
Goal: *Tolerating diet Outcome: Progressing Towards Goal 
Goal: *Demonstrates progressive activity Outcome: Progressing Towards Goal

## 2020-07-10 NOTE — PROGRESS NOTES
Shift Summary: 
 
Bedside and Verbal shift change report given to Sandeep Adams (oncoming nurse) by Ant Alexander RN (offgoing nurse). Report included the following information SBAR, Kardex, Intake/Output, MAR, Recent Results, Cardiac Rhythm sinus rhythm and Alarm Parameters . Primary Nurse Malia Stewart and VERNELL Mccoy performed a dual skin assessment on this patient. Impairment noted- see wound doc flow sheet. Fredy score is 19. Patient is alert, oriented x4. Patient's O2 sats dropping to 88% on room air while she is sleeping. O2 2L NC applied; O2 sats >94%. Monitored surgical site and dressing, + pulses every 2 hours. Dressing is c/d/i; pulses are palpable +2. Patient has had adequate urine output throughout the night. PRN oxycodone and dilaudid given as appropriately for pain management. Patient has slept intermittently throughout the night. See flowsheets for assessments and vital signs. See results for morning lab values. Bedside and Verbal shift change report given to Jennifer Aparicio RN (oncoming nurse) by Tre Live RN (offgoing nurse). Report included the following information SBAR, Kardex, Intake/Output, MAR, Recent Results, Cardiac Rhythm sinus rhythm and Alarm Parameters .

## 2020-07-10 NOTE — CONSULTS
[address] Adult  Hospitalist Group Hospitalist Consult Primary Care Provider: Savanah Payan MD 
Consult requested by: Dr. Tyson Sample History:  
 
Daniele Rose is a 59 y.o. female who underwent a right femoral-tibial bypass with vein graft. She reports pain in her legs and itching over her back. Pain medication seems to work adequately when she receives it. Hospitalist has been consulted to manage diabetes. She is a type 2 diabetic, and her most recent A1C was 8.9 in Oct 2019. She follows with Dr. Kaila Mendez and is currently on an insulin pump, metformin, ozempic, and jardiance. Preoperatively she was instructed to take 16 units of levemir at bedtime with no mealtime boluses of humalog. She was assigned a sliding scale insulin for mealtimes by her endocrinologist. From her previous records it seems like she has been noncompliant with checking sugars and taking medications. Review of Systems: A comprehensive review of systems was negative except for that written in the History of Present Illness. Past Medical History:  
Diagnosis Date  Arrhythmia  CAD (coronary artery disease) 2018  
 2  cardiac stents  Depression  DM (diabetes mellitus) ([address] TYPE II  
 Gastroparesis 01/2014  PAD (peripheral artery disease) ([facility] 01/2015  Thromboembolus ([facility] 1993 RT. LEG Past Surgical History:  
Procedure Laterality Date  HX ARTHROPLASTY  HX BREAST REDUCTION Bilateral 03/2013  HX BUNIONECTOMY  HX GI  2012, 2019 COLONOSCOPY  
 HX LAP CHOLECYSTECTOMY  2010  HX ORTHOPAEDIC Right 02/07/2020  
 hardware removal and bone spur right foot  HX OSTEOTOMY  HX OTHER SURGICAL    
 right foot surgery X2 hammertoe surgery  HX OTHER SURGICAL  12/2016  
 foot surgery  REMOVAL GALLBLADDER Prior to Admission medications Medication Sig Start Date End Date Taking? Authorizing Provider umeclidinium (Incruse Ellipta) 62.5 mcg/actuation inhaler Take 1 Puff by inhalation daily. Yes Provider, Historical  
gabapentin (NEURONTIN) 300 mg capsule Take 300 mg by mouth every morning. Yes Provider, Historical  
gabapentin (NEURONTIN) 300 mg capsule Take 600 mg by mouth nightly. Yes Provider, Historical  
albuterol (Ventolin HFA) 90 mcg/actuation inhaler Take 2 Puffs by inhalation every four (4) hours as needed for Wheezing. Yes Provider, Historical  
metoprolol tartrate (LOPRESSOR) 25 mg tablet Take 12.5 mg by mouth two (2) times a day. Yes Provider, Historical  
insulin glulisine U-100 (Apidra U-100 Insulin) 100 unit/mL injection 0.5 Units by SubCUTAneous route. In insulin pump   Yes Provider, Historical  
fluticasone/umeclidin/vilanter (TRELEGY ELLIPTA IN) Take  by inhalation. Yes Provider, Historical  
lidocaine (LIDODERM) 5 % apply 1 patch to affected area for 12 hours and remove for 12 hours 5/24/18  Yes Provider, Historical  
meclizine (ANTIVERT) 25 mg tablet Take 25 mg by mouth two (2) times daily as needed. Yes Provider, Historical  
traMADol (ULTRAM) 50 mg tablet every six (6) hours as needed. Yes Provider, Historical  
metFORMIN (GLUCOPHAGE) 1,000 mg tablet Take 1 Tab by mouth two (2) times daily (with meals). Stop Glucovance 7/2/19  Yes J Carlos Brandt MD  
valACYclovir (VALTREX) 500 mg tablet Take 500 mg by mouth daily. 12/15/15  Yes Provider, Historical  
traZODone (DESYREL) 100 mg tablet Take 200 mg by mouth nightly. Yes Provider, Historical  
ascorbic acid (VITAMIN C) 1,000 mg tablet Take  by mouth. Yes Provider, Historical  
losartan (COZAAR) 25 mg tablet Take 25 mg by mouth daily. Yes Provider, Historical  
aspirin 81 mg tablet Take 81 mg by mouth daily. Yes Provider, Historical  
TURMERIC PO Take 500 mg by mouth daily. Provider, Historical  
varenicline (Chantix) 1 mg tablet Take 1 mg by mouth two (2) times daily (after meals).     Provider, Historical  
 multivitamin (ONE A DAY) tablet Take 1 Tab by mouth daily. Provider, Historical  
semaglutide (Ozempic) 0.25 mg/0.2 mL (2 mg/1.5 mL) sub-q pen 0.25 mg by SubCUTAneous route every seven (7) days. For first 3 weeks, and then increase to 0.5 mg every week thereafter 6/1/20   Kamila Cummings MD  
empagliflozin (Jardiance) 25 mg tablet TAKE 1 TABLET BY MOUTH DAILY. STOP INVOKANA 6/1/20   Kamila Cummings MD  
fluconazole (DIFLUCAN) 150 mg tablet Take  by mouth daily as needed. Provider, Historical  
clopidogrel (PLAVIX) 75 mg tab Take 75 mg by mouth daily. Provider, Historical  
CALCIUM CARBONATE/VITAMIN D3 (CALCIUM 600 + D,3, PO) Take  by mouth. Provider, Historical  
biotin 10 mg tab Take 1 Tab by mouth daily. Provider, Historical  
CRANBERRY FRUIT PO Take 1 Cap by mouth daily. Provider, Historical  
simvastatin (ZOCOR) 10 mg tablet Take 10 mg by mouth nightly. 12/12/14   Provider, Historical  
cholecalciferol (VITAMIN D3) 1,000 unit tablet Take  by mouth daily. Provider, Historical  
ferrous sulfate (IRON) 325 mg (65 mg iron) tablet Take 65 mg by mouth daily. Provider, Historical  
Insulin Needles, Disposable, (NOVOFINE 32) 32 x 1/4 \" ndle Use 4 times daily 2/7/14   Kamila Cummings MD  
cyanocobalamin (VITAMIN B-12) 1,000 mcg tablet Take 5,000 mcg by mouth daily. Provider, Historical  
omega-3 fatty acids-vitamin e (FISH OIL) 1,000 mg Cap Take 1 Cap by mouth daily. Provider, Historical  
LECITH/CA CARB/E/SOYB/BLK COH (ONE-A-DAY MENOPAUSE HEALTH PO) Take 1 Tab by mouth daily. Provider, Historical  
 
Allergies Allergen Reactions  Levaquin [Levofloxacin] Rash  Pcn [Penicillins] Hives Patient tolerated ancef challenge in preop 7/9/20.  Robaxin [Methocarbamol] Rash Patient not allergic  Rocephin [Ceftriaxone] Rash Family History Problem Relation Age of Onset  Diabetes Mother  Hypertension Mother  Heart Disease Mother Social history: Smoking history:  
Social History Tobacco Use Smoking Status Current Every Day Smoker  Packs/day: 0.75  Years: 20.00  Pack years: 15.00 Smokeless Tobacco Never Used Alcohol history:  
Social History Substance and Sexual Activity Alcohol Use Not Currently Physical Exam:  
 
 
Physical Exam:  
 
General:          Alert, cooperative, no distress, appears stated age. HEENT:           Atraumatic, anicteric sclerae, pink conjunctivae No oral ulcers, mucosa moist, throat clear, dentition fair Neck:               Supple, symmetrical 
Lungs:             Clear to auscultation bilaterally. No Wheezing or Rhonchi. No rales. Heart:              Regular  rhythm,  No  murmur   No edema Abdomen:        Soft, non-tender. Not distended. Bowel sounds normal 
Extremities:     No cyanosis. No clubbing,   
                        Skin turgor normal, Capillary refill normal 
Skin:                Not pale. Not Jaundiced  No rashes Psych:             Not anxious or agitated. Neurologic:      Alert, moves all extremities, answers questions appropriately and responds to commands Imaging and Labs:  
 
Recent Results (from the past 24 hour(s)) GLUCOSE, POC Collection Time: 07/09/20  2:46 PM  
Result Value Ref Range Glucose (POC) 130 (H) 65 - 100 mg/dL Performed by Jud Alonso GLUCOSE, POC Collection Time: 07/09/20  8:45 PM  
Result Value Ref Range Glucose (POC) 113 (H) 65 - 100 mg/dL Performed by Rasheed Peters CBC WITH AUTOMATED DIFF Collection Time: 07/10/20  5:12 AM  
Result Value Ref Range WBC 12.2 (H) 3.6 - 11.0 K/uL  
 RBC 4.83 3.80 - 5.20 M/uL  
 HGB 14.5 11.5 - 16.0 g/dL HCT 44.5 35.0 - 47.0 % MCV 92.1 80.0 - 99.0 FL  
 MCH 30.0 26.0 - 34.0 PG  
 MCHC 32.6 30.0 - 36.5 g/dL  
 RDW 13.5 11.5 - 14.5 % PLATELET 626 941 - 639 K/uL MPV 9.7 8.9 - 12.9 FL  
 NRBC 0.0 0  WBC ABSOLUTE NRBC 0.00 0.00 - 0.01 K/uL NEUTROPHILS 79 (H) 32 - 75 % LYMPHOCYTES 10 (L) 12 - 49 % MONOCYTES 11 5 - 13 % EOSINOPHILS 0 0 - 7 % BASOPHILS 0 0 - 1 % IMMATURE GRANULOCYTES 1 (H) 0.0 - 0.5 % ABS. NEUTROPHILS 9.7 (H) 1.8 - 8.0 K/UL  
 ABS. LYMPHOCYTES 1.2 0.8 - 3.5 K/UL  
 ABS. MONOCYTES 1.3 (H) 0.0 - 1.0 K/UL  
 ABS. EOSINOPHILS 0.0 0.0 - 0.4 K/UL  
 ABS. BASOPHILS 0.0 0.0 - 0.1 K/UL  
 ABS. IMM. GRANS. 0.1 (H) 0.00 - 0.04 K/UL  
 DF AUTOMATED METABOLIC PANEL, BASIC Collection Time: 07/10/20  5:12 AM  
Result Value Ref Range Sodium 141 136 - 145 mmol/L Potassium 3.9 3.5 - 5.1 mmol/L Chloride 110 (H) 97 - 108 mmol/L  
 CO2 26 21 - 32 mmol/L Anion gap 5 5 - 15 mmol/L Glucose 143 (H) 65 - 100 mg/dL BUN 13 6 - 20 MG/DL Creatinine 0.72 0.55 - 1.02 MG/DL  
 BUN/Creatinine ratio 18 12 - 20 GFR est AA >60 >60 ml/min/1.73m2 GFR est non-AA >60 >60 ml/min/1.73m2 Calcium 8.4 (L) 8.5 - 10.1 MG/DL  
GLUCOSE, POC Collection Time: 07/10/20  6:38 AM  
Result Value Ref Range Glucose (POC) 154 (H) 65 - 100 mg/dL Performed by [facility] No results found. Assessment and Plan:  
Thong Holman is a 59 y.o. female who presents with peripheral vascular disease. We are asked to see the patient in consult to assist in managing type 2 diabetes. PVD (peripheral vascular disease)  
-management per vascular surgery Type 2 diabetes 
-uncontrolled, and noncompliant 
-will check A1C 
-agree with resuming metformin. Will resume jardiance, ozempic.  
-Pt is on an insulin pump but doesn't have it currently. Her endocrinologist recommended 18 units of levemir preop as an alternative, so will continue with this 
-will change SSI to reflect recommendations of Dr. Yennifer Parrish 
 
Will continue to follow. Thank you for consulting [facility]. Signed By: Angelina Zamarripa MD   
 Date of Service:  7/10/2020

## 2020-07-10 NOTE — PROGRESS NOTES
Reason for Admission:   PVD,right femoral-tibial bypass RUR Score:      14% Plan for utilizing home health: To be determined PCP: First and Last name:  Dr Brittany Zuniga Name of Practice:  
 Are you a current patient: Yes/No:  
 Approximate date of last visit:  
 Can you participate in a virtual visit with your PCP:  
                 
Current Advanced Directive/Advance Care Plan: full code, no AMD 
                      
Transition of Care Plan:                   
Pt is POD #1 s/p right femoral-tibial bypass surgery due to peripheral vascular disease,  is Shanika Boucher [phone] or [phone])PCP is Dr Sanchez Anderson endocrinologist is Dr Tavo Milton. 
Pt does not anticipate any home health or rehab needs, Pt will be transferring to the 4th floor.  
 
Yessica Herrera

## 2020-07-10 NOTE — PROGRESS NOTES
Primary Nurse Jayna Phoenix and Susannah Najjar, RN performed a dual skin assessment on this patient No impairment noted Fredy score is 17

## 2020-07-10 NOTE — PROGRESS NOTES
0700- Bedside shift change report given to [address] [place] [address] [address] (oncoming nurse) by Devonte Wayne (offgoing nurse). Report included the following information SBAR, Kardex, OR Summary, Intake/Output, MAR, Recent Results, Med Rec Status and Cardiac Rhythm NSR. Pt resting in bed quietly, on 2 liters NC.  
 
0800- Shift assessment complete. Pt alert and oriented, following all commands. Neuro status intact. Lungs sounds diminished throughout, no cough noted. Pt is on 2 liters NC, oxygenating 91%. No sob or bunch noted. BP have been running on the softer side 92/51. Pt is in NSR. Abdomen soft and intact with active bowel sounds. Right leg has dressings intact, upper dressing with old drainage on it. Pt has no c/o pain currently. Pt has good sensation and pulses in both lower extremities. Taveras in place, patent and draining. Scheduled meds given at this time. 0945- Pt satting at 88% with a good pleath, instructed pt to take deep breaths. Oxygen levels stayed the same. Increased oxgen to 4 liters NC, now oxygenating at 94%. Pt blood pressure 90/46 with a map of 57, notified hospitalist. No new orders received. 1100- Pt resting comfortably in bed, asking to get up and move around. Educated pt that currently she is on bedrest as it is only post op day 1 and that physical therapy should see her today and hopefully get out OOB. Pt has orders to transfer out. 1120- Respiratory therapy decreased pt's oxygen to 3 liters NC 
 
1200- Re-assessment complete, no changes noted. Pt maintaining 100% oxygen on 3 liters 1340- Pt calling out saying her leg hurts scale 7/10, medicated with percocet. See flowsheet 1420- pt has a bed on the 4th floor, awaiting to give report. 1500- TRANSFER - OUT REPORT: 
 
Verbal report given to Nico Izquierdo (name) on [facility]  being transferred to 4th floor(unit) for routine progression of care Report consisted of patients Situation, Background, Assessment and  
Recommendations(SBAR). Information from the following report(s) SBAR, Kardex, OR Summary, Procedure Summary, Intake/Output, Recent Results and Cardiac Rhythm NSR was reviewed with the receiving nurse. Lines:  
Peripheral IV 07/09/20 Left Forearm (Active) Site Assessment Clean, dry, & intact 07/10/20 1200 Phlebitis Assessment 0 07/10/20 1200 Infiltration Assessment 0 07/10/20 1200 Dressing Status Clean, dry, & intact 07/10/20 1200 Dressing Type Transparent 07/10/20 1200 Hub Color/Line Status Pink; Infusing 07/10/20 1200 Action Taken Open ports on tubing capped 07/10/20 1200 Alcohol Cap Used Yes 07/10/20 1200 Opportunity for questions and clarification was provided. Patient transported with: 
 Monitor O2 @ 4 liters Registered Nurse

## 2020-07-10 NOTE — PERIOP NOTES
TRANSFER - OUT REPORT: 
 
Verbal report given to Ayanna Garcia RN(name) on Thong Holman  being transferred to ICU 6 (unit) for routine post - op Report consisted of patients Situation, Background, Assessment and  
Recommendations(SBAR). Information from the following report(s) SBAR, Kardex and MAR was reviewed with the receiving nurse. Lines:  
Peripheral IV 07/09/20 Left Forearm (Active) Site Assessment Clean, dry, & intact 07/09/20 1454 Phlebitis Assessment 0 07/09/20 1454 Infiltration Assessment 0 07/09/20 1454 Dressing Status Clean, dry, & intact 07/09/20 1454 Dressing Type Transparent 07/09/20 1454 Hub Color/Line Status Infusing;Patent;Pink 07/09/20 1454 Alcohol Cap Used Yes 07/09/20 1454 Opportunity for questions and clarification was provided. Patient transported with: 
 Monitor O2 @ 2 liters Registered Nurse

## 2020-07-11 NOTE — OP NOTES
Jose Munroe [facility] 
OPERATIVE REPORT Name:  Maury Dickens 
MR#:  [id number] :  1955 ACCOUNT #:  [de-identified] DATE OF SERVICE:  2020 PREOPERATIVE DIAGNOSIS:  Peripheral vascular disease. POSTOPERATIVE DIAGNOSIS:  Peripheral vascular disease. PROCEDURE PERFORMED:  Right common femoral to anterior tibial artery bypass with PTFE. SURGEON:  Matt Cortez MD 
 
ASSISTANT:  None. ANESTHESIA:  General endotracheal anesthesia. COMPLICATIONS:  None. SPECIMENS REMOVED:  None. IMPLANTS:  Graft. ESTIMATED BLOOD LOSS:  300 mL. INDICATIONS FOR PROCEDURE:  The patient is a middle-aged female with multilevel occlusive disease. Decision was made to take her to the operating room for bypass. TECHNICAL DETAILS:  The patient was taken to the operating room. Once a suitable level of anesthesia was introduced, prepped and draped in a typical sterile fashion. An oblique incision was made in the right groin and dissection carried out down to the saphenous vein which was dissected free of its soft tissue attachments. The saphenous vein was explored down to the medial thigh where it became approximately 1 mm in size and diverted into multiple branches. It was decided that this was not usable as a conduit. The common femoral artery and the profunda femoral arteries were all dissected free of their soft tissue attachments. The anterior tibial artery was exposed at the distal lower leg and the patient was systemically heparinized. The PTFE graft was then sewed end-to-side to the anterior tibial artery using 6-0 Prolene suture. It was then tunneled through the interosseous membrane and up the medial leg, up to the groin where with sewed end-to-side to the common femoral artery. Flow was restored to the graft. There was excellent signal throughout. Wounds were irrigated thoroughly. Hemostasis were controlled.   Wounds were closed in multiple layers. He tolerated the procedure well. Sponge and instrument counts were correct x2. He was awakened and transferred to recovery room in good condition. Jason Charles MD 
 
 
[id number] 
D:  07/10/2020 16:15 
T:  07/11/2020 1:48 JOB #:  [id number]

## 2020-07-11 NOTE — PROGRESS NOTES
Vascular Pain improving Visit Vitals /64 (BP 1 Location: Left arm, BP Patient Position: At rest) Pulse 88 Temp 98.4 °F (36.9 °C) Resp 16 Ht 5' 2\" (1.575 m) Wt 55.3 kg (121 lb 14.6 oz) SpO2 97% Breastfeeding No  
BMI 22.30 kg/m² Right foot hot Incisions soft Palpable pulse 71 y/o WF s/p right fem-tibial with PTFE 
- Doing well, needs to get OOB 
- Likely d/c tomorrow?

## 2020-07-11 NOTE — PROGRESS NOTES
[address] Adult  Hospitalist Group Hospitalist Progress Note Heidy Paul MD 
  
  
Date of Service:  2020 NAME:  Kassy Lange :  1955 MRN:  [id number] Admission Summary:  
Kassy Lange is a 59 y.o. female who presents with peripheral vascular disease. We are asked to see the patient in consult to assist in managing type 2 diabetes. Interval history / Subjective:  
  Pt has no complaints today, ambulating to bathroom with wheeled walker. Assessment & Plan:  
 
PVD (peripheral vascular disease)  
-management per vascular surgery 
  
Type 2 diabetes 
-controlled 
-A1C 7.0 
-resumed metformin,jardiance, ozempic.  
-Pt is on an insulin pump but doesn't have it currently. Her endocrinologist recommended 18 units of levemir preop as an alternative, so will continue with this 
-will change SSI to reflect recommendations of Dr. Roseann Herrera  
 
 
Code status: full DVT prophylaxis: heparin Hospital Problems  Date Reviewed: 2019 Codes Class Noted POA  
 PVD (peripheral vascular disease) ([facility] ICD-10-CM: I73.9 ICD-9-CM: 443.9  2020 Unknown Review of Systems: A comprehensive review of systems was negative except for that written in the HPI. Vital Signs:  
 Last 24hrs VS reviewed since prior progress note. Most recent are: 
Visit Vitals /64 (BP 1 Location: Left arm, BP Patient Position: At rest) Pulse 88 Temp 98.4 °F (36.9 °C) Resp 16 Ht 5' 2\" (1.575 m) Wt 55.3 kg (121 lb 14.6 oz) SpO2 97% Breastfeeding No  
BMI 22.30 kg/m² Intake/Output Summary (Last 24 hours) at 2020 1727 Last data filed at 2020 1223 Gross per 24 hour Intake 1028.75 ml Output 900 ml Net 128.75 ml Physical Examination:  
 
 
     
Constitutional:  No acute distress, cooperative, pleasant ENT:  Oral mucosa moist, oropharynx benign. Resp:  CTA bilaterally. No wheezing/rhonchi/rales. No accessory muscle use CV:  Regular rhythm, normal rate, no murmurs, gallops, rubs GI:  Soft, non distended, non tender. normoactive bowel sounds, no hepatosplenomegaly Musculoskeletal:  No edema, warm, 2+ pulses throughout Neurologic:  Moves all extremities. AAOx3, CN II-XII reviewed Data Review:  
 Review and/or order of clinical lab test 
 
 
Labs:  
 
Recent Labs  
  07/10/20 
0512 WBC 12.2* HGB 14.5 HCT 44.5  Recent Labs  
  07/10/20 
4292   
K 3.9 * CO2 26 BUN 13  
CREA 0.72 * CA 8.4* No results for input(s): ALT, AP, TBIL, TBILI, TP, ALB, GLOB, GGT, AML, LPSE in the last 72 hours. No lab exists for component: SGOT, GPT, AMYP, HLPSE No results for input(s): INR, PTP, APTT, INREXT in the last 72 hours. No results for input(s): FE, TIBC, PSAT, FERR in the last 72 hours. No results found for: FOL, RBCF No results for input(s): PH, PCO2, PO2 in the last 72 hours. No results for input(s): CPK, CKNDX, TROIQ in the last 72 hours. No lab exists for component: CPKMB Lab Results Component Value Date/Time Cholesterol, total 149 10/28/2019 08:42 AM  
 HDL Cholesterol 53 10/28/2019 08:42 AM  
 LDL, calculated 76 10/28/2019 08:42 AM  
 Triglyceride 100 10/28/2019 08:42 AM  
 
Lab Results Component Value Date/Time Glucose (POC) 103 (H) 07/11/2020 04:38 PM  
 Glucose (POC) 179 (H) 07/11/2020 01:18 PM  
 Glucose (POC) 165 (H) 07/11/2020 06:14 AM  
 Glucose (POC) 172 (H) 07/10/2020 09:28 PM  
 Glucose (POC) 161 (H) 07/10/2020 04:24 PM  
 
No results found for: COLOR, APPRN, SPGRU, REFSG, DAVID, PROTU, GLUCU, Daniela Monroe, BILU, UROU, ANDREE, LEUKU, GLUKE, EPSU, BACTU, WBCU, RBCU, CASTS, UCRY Medications Reviewed:  
 
Current Facility-Administered Medications Medication Dose Route Frequency  albuterol-ipratropium (DUO-NEB) 2.5 MG-0.5 MG/3 ML  3 mL Nebulization Q4H PRN  
 empagliflozin (JARDIANCE) tablet 25 mg  (Patient Supplied)  25 mg Oral DAILY  insulin glargine (LANTUS) injection 18 Units  18 Units SubCUTAneous QHS  morphine injection 2 mg  2 mg IntraVENous Q2H PRN  
 diphenhydrAMINE (BENADRYL) capsule 25 mg  25 mg Oral Q6H PRN  
 cyanocobalamin (VITAMIN B12) tablet 5,000 mcg  5,000 mcg Oral DAILY  ferrous sulfate tablet 325 mg  325 mg Oral DAILY  losartan (COZAAR) tablet 25 mg  25 mg Oral DAILY  atorvastatin (LIPITOR) tablet 10 mg  10 mg Oral DAILY  traZODone (DESYREL) tablet 200 mg  200 mg Oral QHS  valACYclovir (VALTREX) tablet 500 mg  500 mg Oral DAILY  clopidogreL (PLAVIX) tablet 75 mg  75 mg Oral DAILY  metFORMIN (GLUCOPHAGE) tablet 1,000 mg  1,000 mg Oral BID WITH MEALS  gabapentin (NEURONTIN) capsule 300 mg  300 mg Oral 7am  
 gabapentin (NEURONTIN) capsule 600 mg  600 mg Oral QHS  varenicline (CHANTIX) 0.5 mg tablet 1 mg  1 mg Oral BIDPC  metoprolol tartrate (LOPRESSOR) tablet 12.5 mg  12.5 mg Oral BID  therapeutic multivitamin (THERAGRAN) tablet 1 Tab  1 Tab Oral DAILY  ipratropium (ATROVENT) 0.02 % nebulizer solution 0.5 mg  0.5 mg Nebulization Q4H PRN  
 sodium chloride (NS) flush 5-40 mL  5-40 mL IntraVENous Q8H  
 sodium chloride (NS) flush 5-40 mL  5-40 mL IntraVENous PRN  
 oxyCODONE-acetaminophen (PERCOCET) 5-325 mg per tablet 1 Tab  1 Tab Oral Q4H PRN  
 heparin (porcine) injection 5,000 Units  5,000 Units SubCUTAneous Q8H  
 insulin regular (NOVOLIN R, HUMULIN R) injection   SubCUTAneous AC&HS  
 glucose chewable tablet 16 g  4 Tab Oral PRN  
 dextrose (D50W) injection syrg 12.5-25 g  12.5-25 g IntraVENous PRN  
 glucagon (GLUCAGEN) injection 1 mg  1 mg IntraMUSCular PRN  
 
______________________________________________________________________ EXPECTED LENGTH OF STAY: 2d 4h 
ACTUAL LENGTH OF STAY:          2 
 
            
 Rome Langford MD

## 2020-07-11 NOTE — PROGRESS NOTES
Patient has been refusing nebulizer treatments. Patient takes them as needed at home. Will change order to Q4 as needed.

## 2020-07-12 NOTE — DISCHARGE SUMMARY
Physician Discharge Summary Patient ID: 
Adolfo Berumen [id number] 
72 y.o. 
1955 Allergies: Levaquin [levofloxacin]; Pcn [penicillins]; Robaxin [methocarbamol]; and Rocephin [ceftriaxone] Admit date: 7/9/2020 Discharge date: 7/12/2020 Admitting Physician: Treasure Lehman MD  
 
Discharge Physician: Ru Acevedo MD 
 
* Admission Diagnoses: PVD (peripheral vascular disease) ([facility] [I73.9] * Discharge Diagnoses:  
Hospital Problems as of 7/12/2020 Date Reviewed: 11/25/2019 Codes Class Noted - Resolved POA  
 PVD (peripheral vascular disease) ([facility] ICD-10-CM: I73.9 ICD-9-CM: 443.9  7/9/2020 - Present Unknown * Procedures for this admission: Procedure(s): RIGHT FEMORAL-TIBIAL BYPASS WITH VEIN 
 
 
* Discharged Condition: Community Hospital Course: She underwent a right femoral- AT bypass with PTFE on 7/9. She had expected postoperative pain and was ambulating with assistance. Her pain was controlled and stable for discharge. Consults: Hospitalist 
 
Discharge Exam: Awake and oriented, NAD, incisions clean, palpable DP pulse * Disposition: Home Discharge Medications:  
Current Discharge Medication List  
  
START taking these medications Details  
oxyCODONE-acetaminophen (PERCOCET) 5-325 mg per tablet Take 1 Tab by mouth every six (6) hours as needed for Pain for up to 14 days. Max Daily Amount: 4 Tabs. Qty: 30 Tab, Refills: 0 Associated Diagnoses: PVD (peripheral vascular disease) ([facility] CONTINUE these medications which have NOT CHANGED Details  
umeclidinium (Incruse Ellipta) 62.5 mcg/actuation inhaler Take 1 Puff by inhalation daily. !! gabapentin (NEURONTIN) 300 mg capsule Take 300 mg by mouth every morning. !! gabapentin (NEURONTIN) 300 mg capsule Take 600 mg by mouth nightly. albuterol (Ventolin HFA) 90 mcg/actuation inhaler Take 2 Puffs by inhalation every four (4) hours as needed for Wheezing. metoprolol tartrate (LOPRESSOR) 25 mg tablet Take 12.5 mg by mouth two (2) times a day. insulin glulisine U-100 (Apidra U-100 Insulin) 100 unit/mL injection 0.5 Units by SubCUTAneous route. In insulin pump  
  
fluticasone/umeclidin/vilanter (TRELEGY ELLIPTA IN) Take  by inhalation. lidocaine (LIDODERM) 5 % apply 1 patch to affected area for 12 hours and remove for 12 hours  
  
meclizine (ANTIVERT) 25 mg tablet Take 25 mg by mouth two (2) times daily as needed. traMADol (ULTRAM) 50 mg tablet every six (6) hours as needed. metFORMIN (GLUCOPHAGE) 1,000 mg tablet Take 1 Tab by mouth two (2) times daily (with meals). Stop Glucovance 
Qty: 60 Tab, Refills: 6 Associated Diagnoses: Type 1 diabetes mellitus without complication ([facility] Insulin pump status; PAD (peripheral artery disease) ([facility] Essential hypertension; Mixed hyperlipidemia  
  
valACYclovir (VALTREX) 500 mg tablet Take 500 mg by mouth daily. Refills: 4  
  
traZODone (DESYREL) 100 mg tablet Take 200 mg by mouth nightly. ascorbic acid (VITAMIN C) 1,000 mg tablet Take  by mouth.  
  
losartan (COZAAR) 25 mg tablet Take 25 mg by mouth daily. aspirin 81 mg tablet Take 81 mg by mouth daily. Associated Diagnoses: DM (diabetes mellitus) ([facility] TURMERIC PO Take 500 mg by mouth daily. varenicline (Chantix) 1 mg tablet Take 1 mg by mouth two (2) times daily (after meals). multivitamin (ONE A DAY) tablet Take 1 Tab by mouth daily. semaglutide (Ozempic) 0.25 mg/0.2 mL (2 mg/1.5 mL) sub-q pen 0.25 mg by SubCUTAneous route every seven (7) days. For first 3 weeks, and then increase to 0.5 mg every week thereafter 
Qty: 6 Box, Refills: 4  
  
empagliflozin (Jardiance) 25 mg tablet TAKE 1 TABLET BY MOUTH DAILY. STOP [facility] Qty: 30 Tab, Refills: 6 Associated Diagnoses: Type 1 diabetes mellitus without complication ([facility]  Insulin pump status; PAD (peripheral artery disease) ([facility] Essential hypertension; Mixed hyperlipidemia  
  
fluconazole (DIFLUCAN) 150 mg tablet Take  by mouth daily as needed. clopidogrel (PLAVIX) 75 mg tab Take 75 mg by mouth daily. CALCIUM CARBONATE/VITAMIN D3 (CALCIUM 600 + D,3, PO) Take  by mouth. Associated Diagnoses: Type 2 diabetes mellitus with complication, unspecified long term insulin use status; Insulin pump status  
  
biotin 10 mg tab Take 1 Tab by mouth daily. Associated Diagnoses: Type 2 diabetes mellitus with complication, unspecified long term insulin use status; Insulin pump status CRANBERRY FRUIT PO Take 1 Cap by mouth daily. Associated Diagnoses: Type 2 diabetes mellitus with complication, unspecified long term insulin use status; Insulin pump status  
  
simvastatin (ZOCOR) 10 mg tablet Take 10 mg by mouth nightly. Refills: 5 Associated Diagnoses: DM (diabetes mellitus) ([address]  
  
cholecalciferol (VITAMIN D3) 1,000 unit tablet Take  by mouth daily. ferrous sulfate (IRON) 325 mg (65 mg iron) tablet Take 65 mg by mouth daily. Insulin Needles, Disposable, (NOVOFINE 32) 32 x 1/4 \" ndle Use 4 times daily 
Qty: 200 Each, Refills: 6 Associated Diagnoses: DM (diabetes mellitus) ([address] Depression; Hyperlipidemia; HTN (hypertension) cyanocobalamin (VITAMIN B-12) 1,000 mcg tablet Take 5,000 mcg by mouth daily. Associated Diagnoses: DM (diabetes mellitus) ([address] omega-3 fatty acids-vitamin e (FISH OIL) 1,000 mg Cap Take 1 Cap by mouth daily. Associated Diagnoses: DM (diabetes mellitus) ([address] LECITH/CA CARB/E/SOYB/BLK COH (ONE-A-DAY MENOPAUSE HEALTH PO) Take 1 Tab by mouth daily. Associated Diagnoses: DM (diabetes mellitus) ([address] ! ! - Potential duplicate medications found. Please discuss with provider. * Follow-up Care/Patient Instructions: Activity: Activity as tolerated Diet: Diabetic Diet Wound Care: Keep wound clean and dry Follow-up Information Follow up With Specialties Details Why Contact Info Rose Paniagua MD Family Practice   [address] [facility] [address] [place] [address] 
[phone] Jim Michaels MD General and Vascular Surgery In 2 weeks  [address] [facility] [address] [place] [address] 
[phone] Signed: Dory Mejia MD 
7/12/2020 
8:02 AM

## 2020-07-12 NOTE — PROGRESS NOTES
Transition of Care 
RUR 14% 1- follow for progress and assurance of confidence to return home/ coordination of care with therapy department and nursing. 2- assure acceptance by [facility] referral for home therapy safety eval and strengthening. 3- plan is to go home by family car. 2:35 PM 
No preference for home health, agrees with referral to [facility]. Knows discharge is anticipated soon, is feeling not quite ready, she said. When CM reviewed goals with her, she identified that she is not yet able to transfer out of the bed independently without using the bedrails, and still  Needs assistance to manage in the bathroom. She wants to be able to practice these things a bit more, along with inhouse therapy and nursing as well.  5 steps to enter her home, which is all on one level once she gets in. Has not had any therapy over the weekend. 9:52 AM 
Consult received that patient may need home PT. Reviewing choice with Patient and will refer to [facility] as indicated. [employer] coverage.

## 2020-07-12 NOTE — PROGRESS NOTES
[facility] 
[address] [facility], [address] [place] [address] 
[phone] Medical Progress Note NAME: Gio Guerra :  1955 MRM:  [id number] Date/Time of service: 2020  9:56 AM 
 
  
Subjective: Chief Complaint:  Patient was personally seen and examined by me during this time period. Chart reviewed. Doing well. Minimal pain. Vascular surg will discharge patient Objective:  
 
 
Vitals:  
 
 
Last 24hrs VS reviewed since prior progress note. Most recent are: 
 
Visit Vitals /61 Pulse 100 Temp 98.4 °F (36.9 °C) Resp 20 Ht 5' 2\" (1.575 m) Wt 55.3 kg (121 lb 14.6 oz) SpO2 97% Breastfeeding No  
BMI 22.30 kg/m² SpO2 Readings from Last 6 Encounters:  
20 97% 20 94% 18 99% 18 94% 18 98% 16 96% O2 Flow Rate (L/min): 3 l/min Intake/Output Summary (Last 24 hours) at 2020 2340 Last data filed at 2020 1606 Gross per 24 hour Intake 540 ml Output 1000 ml Net -460 ml Exam:  
 
Physical Exam: 
 
Gen:  Well-developed, well-nourished, in no acute distress HEENT:  Pink conjunctivae, PERRL, hearing intact to voice, moist mucous membranes Neck:  Supple, without masses, thyroid non-tender Resp:  No accessory muscle use, clear breath sounds without wheezes rales or rhonchi 
Card:  No murmurs, normal S1, S2 without thrills, bruits or peripheral edema Abd:  Soft, non-tender, non-distended, normoactive bowel sounds are present Musc:  No cyanosis or clubbing Skin:  No rashes, right leg dressing c/d/i Neuro:  Cranial nerves 3-12 are grossly intact, follows commands appropriately Psych:  Good insight, oriented to person, place and time, alert Medications Reviewed: (see below) Lab Data Reviewed: (see below) 
 
______________________________________________________________________ Medications:  
 
Current Facility-Administered Medications Medication Dose Route Frequency  potassium phosphate 30 mmol in 0.9% sodium chloride 250 mL infusion   IntraVENous ONCE  
 albuterol-ipratropium (DUO-NEB) 2.5 MG-0.5 MG/3 ML  3 mL Nebulization Q4H PRN  
 empagliflozin (JARDIANCE) tablet 25 mg  (Patient Supplied)  25 mg Oral DAILY  insulin glargine (LANTUS) injection 18 Units  18 Units SubCUTAneous QHS  morphine injection 2 mg  2 mg IntraVENous Q2H PRN  
 diphenhydrAMINE (BENADRYL) capsule 25 mg  25 mg Oral Q6H PRN  
 cyanocobalamin (VITAMIN B12) tablet 5,000 mcg  5,000 mcg Oral DAILY  ferrous sulfate tablet 325 mg  325 mg Oral DAILY  losartan (COZAAR) tablet 25 mg  25 mg Oral DAILY  atorvastatin (LIPITOR) tablet 10 mg  10 mg Oral DAILY  traZODone (DESYREL) tablet 200 mg  200 mg Oral QHS  valACYclovir (VALTREX) tablet 500 mg  500 mg Oral DAILY  clopidogreL (PLAVIX) tablet 75 mg  75 mg Oral DAILY  metFORMIN (GLUCOPHAGE) tablet 1,000 mg  1,000 mg Oral BID WITH MEALS  gabapentin (NEURONTIN) capsule 300 mg  300 mg Oral 7am  
 gabapentin (NEURONTIN) capsule 600 mg  600 mg Oral QHS  varenicline (CHANTIX) 0.5 mg tablet 1 mg  1 mg Oral BIDPC  metoprolol tartrate (LOPRESSOR) tablet 12.5 mg  12.5 mg Oral BID  therapeutic multivitamin (THERAGRAN) tablet 1 Tab  1 Tab Oral DAILY  ipratropium (ATROVENT) 0.02 % nebulizer solution 0.5 mg  0.5 mg Nebulization Q4H PRN  
 sodium chloride (NS) flush 5-40 mL  5-40 mL IntraVENous Q8H  
 sodium chloride (NS) flush 5-40 mL  5-40 mL IntraVENous PRN  
 oxyCODONE-acetaminophen (PERCOCET) 5-325 mg per tablet 1 Tab  1 Tab Oral Q4H PRN  
 heparin (porcine) injection 5,000 Units  5,000 Units SubCUTAneous Q8H  
 insulin regular (NOVOLIN R, HUMULIN R) injection   SubCUTAneous AC&HS  
 glucose chewable tablet 16 g  4 Tab Oral PRN  
 dextrose (D50W) injection syrg 12.5-25 g  12.5-25 g IntraVENous PRN  
 glucagon (GLUCAGEN) injection 1 mg  1 mg IntraMUSCular PRN Lab Review: Recent Labs  
  07/12/20 
0351 07/10/20 
3105 WBC 11.8* 12.2* HGB 14.1 14.5 HCT 42.8 44.5  185 Recent Labs  
  07/12/20 
0351 07/10/20 
6997  141  
K 3.6 3.9  110* CO2 26 26 * 143* BUN 6 13 CREA 0.63 0.72  
CA 8.6 8.4* MG 1.6  --   
PHOS 1.1*  --   
 
Lab Results Component Value Date/Time Glucose (POC) 109 (H) 07/12/2020 06:04 AM  
 Glucose (POC) 119 (H) 07/11/2020 10:25 PM  
 Glucose (POC) 103 (H) 07/11/2020 04:38 PM  
 Glucose (POC) 179 (H) 07/11/2020 01:18 PM  
 Glucose (POC) 165 (H) 07/11/2020 06:14 AM  
 
 
  
Assessment / Plan:  
 
71 yo hx of HTN, DM, CAD, PAD, s/p right fem-tib bypass. Medicine was consulted for medical management 1) PAD: s/p right fem-tib bypass. Cont plavix, statin. Rest of management per Vascular 2) HTN: cont losartan, metoprolol 3) DM type 2: A1C 7.0%. Cont Lantus while here. At home, can resume insulin pump, metformin, ozempic, jardiance. F/u with Dr. Uma Patterson 
 
4) CAD: cont BB, plavix, statin Total time spent with patient: 25 min **I personally saw and examined the patient during this time period** Care Plan discussed with: Patient Discussed:  Care Plan Prophylaxis:  per Vascular Disposition:  per Vascular 
        
___________________________________________________ Attending Physician: Yolis Mccoy MD

## 2020-07-13 NOTE — PROGRESS NOTES
7/13/2020 1:21 PM Called and spoke with [facility] again, they are still awaiting insurance verification. 7/13/2020  12:28 PM Spoke with [facility] liaison, referral pending. 7/13/2020 11:31 AM [facility]  cannot accept pt. Met with pt at bedside, discussed alternative home health providers. Pt signed choice letter for [facility] or [address] [place] [address], referrals sent and are pending. Notified both agencies discharge today. Pt reported her  will transport her home at discharge. Care Management Interventions PCP Verified by CM: Yes(Dr Yessy Carroll) Transition of Care Consult (CM Consult): Discharge Planning Current Support Network: Lives with Spouse Confirm Follow Up Transport: Family The Plan for Transition of Care is Related to the Following Treatment Goals : discharge The Patient and/or Patient Representative was Provided with a Choice of Provider and Agrees with the Discharge Plan?: Yes Name of the Patient Representative Who was Provided with a Choice of Provider and Agrees with the Discharge Plan: (S) pt and -Mikhail Stockton [place] of Choice List was Provided with Basic Dialogue that Supports the Patient's Individualized Plan of Care/Goals, Treatment Preferences and Shares the Quality Data Associated with the Providers?: Yes Discharge Location Discharge Placement: Home with home health 7/13/2020 9:48 AM Called to [facility] intake, spoke with Elle Chu they will review pt's referral. CM notified pt will discharge home today. 7/13/2020  8:37 AM Discharge order noted. Home health referral pending with [facility]. Message sent to [facility] notifying of discharge today. LATOYA Byrd

## 2020-07-13 NOTE — PROGRESS NOTES
[facility] 
[address] 
[phone] Medical Progress Note NAME: Shoaib Mack :  1955 MRM:  [id number] Date/Time of service: 2020  11:38 AM 
 
  
Subjective: Chief Complaint:  Patient was personally seen and examined by me during this time period. Chart reviewed. Blood sugar stable. Going home today Objective:  
 
 
Vitals:  
 
 
Last 24hrs VS reviewed since prior progress note. Most recent are: 
 
Visit Vitals /57 (BP 1 Location: Left arm, BP Patient Position: Sitting) Pulse 69 Temp 97.7 °F (36.5 °C) Resp 16 Ht 5' 2\" (1.575 m) Wt 55.3 kg (121 lb 14.6 oz) SpO2 96% Breastfeeding No  
BMI 22.30 kg/m² SpO2 Readings from Last 6 Encounters:  
20 96% 20 94% 18 99% 18 94% 18 98% 16 96% O2 Flow Rate (L/min): 3 l/min Intake/Output Summary (Last 24 hours) at 2020 1138 Last data filed at 2020 1040 Gross per 24 hour Intake 500 ml Output 400 ml Net 100 ml Exam:  
 
Physical Exam: 
 
Gen:  Well-developed, well-nourished, in no acute distress HEENT:  Pink conjunctivae, PERRL, hearing intact to voice, moist mucous membranes Neck:  Supple, without masses, thyroid non-tender Resp:  No accessory muscle use, clear breath sounds without wheezes rales or rhonchi 
Card:  No murmurs, normal S1, S2 without thrills, bruits or peripheral edema Abd:  Soft, non-tender, non-distended, normoactive bowel sounds are present Musc:  No cyanosis or clubbing Skin:  No rashes, right leg dressing c/d/i Neuro:  Cranial nerves 3-12 are grossly intact, follows commands appropriately Psych:  Good insight, oriented to person, place and time, alert Medications Reviewed: (see below) Lab Data Reviewed: (see below) 
 
______________________________________________________________________ Medications: Current Facility-Administered Medications Medication Dose Route Frequency  loperamide (IMODIUM) capsule 2 mg  2 mg Oral PRN  
 albuterol-ipratropium (DUO-NEB) 2.5 MG-0.5 MG/3 ML  3 mL Nebulization Q4H PRN  
 empagliflozin (JARDIANCE) tablet 25 mg  (Patient Supplied)  25 mg Oral DAILY  insulin glargine (LANTUS) injection 18 Units  18 Units SubCUTAneous QHS  morphine injection 2 mg  2 mg IntraVENous Q2H PRN  
 diphenhydrAMINE (BENADRYL) capsule 25 mg  25 mg Oral Q6H PRN  
 cyanocobalamin (VITAMIN B12) tablet 5,000 mcg  5,000 mcg Oral DAILY  ferrous sulfate tablet 325 mg  325 mg Oral DAILY  losartan (COZAAR) tablet 25 mg  25 mg Oral DAILY  atorvastatin (LIPITOR) tablet 10 mg  10 mg Oral DAILY  traZODone (DESYREL) tablet 200 mg  200 mg Oral QHS  valACYclovir (VALTREX) tablet 500 mg  500 mg Oral DAILY  clopidogreL (PLAVIX) tablet 75 mg  75 mg Oral DAILY  metFORMIN (GLUCOPHAGE) tablet 1,000 mg  1,000 mg Oral BID WITH MEALS  gabapentin (NEURONTIN) capsule 300 mg  300 mg Oral 7am  
 gabapentin (NEURONTIN) capsule 600 mg  600 mg Oral QHS  varenicline (CHANTIX) 0.5 mg tablet 1 mg  1 mg Oral BIDPC  metoprolol tartrate (LOPRESSOR) tablet 12.5 mg  12.5 mg Oral BID  therapeutic multivitamin (THERAGRAN) tablet 1 Tab  1 Tab Oral DAILY  ipratropium (ATROVENT) 0.02 % nebulizer solution 0.5 mg  0.5 mg Nebulization Q4H PRN  
 sodium chloride (NS) flush 5-40 mL  5-40 mL IntraVENous Q8H  
 sodium chloride (NS) flush 5-40 mL  5-40 mL IntraVENous PRN  
 oxyCODONE-acetaminophen (PERCOCET) 5-325 mg per tablet 1 Tab  1 Tab Oral Q4H PRN  
 heparin (porcine) injection 5,000 Units  5,000 Units SubCUTAneous Q8H  
 insulin regular (NOVOLIN R, HUMULIN R) injection   SubCUTAneous AC&HS  
 glucose chewable tablet 16 g  4 Tab Oral PRN  
 dextrose (D50W) injection syrg 12.5-25 g  12.5-25 g IntraVENous PRN  
 glucagon (GLUCAGEN) injection 1 mg  1 mg IntraMUSCular PRN Lab Review: Recent Labs  
  07/12/20 
0351 WBC 11.8* HGB 14.1 HCT 42.8  Recent Labs  
  07/13/20 
0456 07/12/20 
0351  137  
K 3.5 3.6  107 CO2 25 26 GLU 81 101* BUN 8 6 CREA 0.62 0.63 CA 8.5 8.6 MG 1.6 1.6 PHOS 2.2* 1.1* Lab Results Component Value Date/Time Glucose (POC) 117 (H) 07/13/2020 11:02 AM  
 Glucose (POC) 96 07/13/2020 06:19 AM  
 Glucose (POC) 100 07/12/2020 09:02 PM  
 Glucose (POC) 97 07/12/2020 04:42 PM  
 Glucose (POC) 86 07/12/2020 12:45 PM  
 
 
  
Assessment / Plan:  
 
71 yo hx of HTN, DM, CAD, PAD, s/p right fem-tib bypass. Medicine was consulted for medical management 1) PAD: s/p right fem-tib bypass. Cont plavix, statin. Rest of management per Vascular 2) HTN: cont losartan, metoprolol 3) DM type 2: A1C 7.0%. Cont Lantus while here. At home, can resume insulin pump, metformin, ozempic, jardiance. F/u with Dr. Dayana Gregory 
 
4) CAD: cont BB, plavix, statin Total time spent with patient: 20 min **I personally saw and examined the patient during this time period** Care Plan discussed with: Patient Discussed:  Care Plan Prophylaxis:  per Vascular Disposition:  per Vascular 
        
___________________________________________________ Attending Physician: Olga Campbell MD

## 2021-01-01 ENCOUNTER — HOSPITAL ENCOUNTER (EMERGENCY)
Age: 66
End: 2021-03-17
Attending: EMERGENCY MEDICINE
Payer: MEDICARE

## 2021-01-01 ENCOUNTER — HOSPITAL ENCOUNTER (OUTPATIENT)
Dept: CT IMAGING | Age: 66
Discharge: HOME | End: 2021-01-20
Attending: SPECIALIST
Payer: MEDICARE

## 2021-01-01 ENCOUNTER — TRANSCRIBE ORDER (OUTPATIENT)
Dept: SCHEDULING | Age: 66
End: 2021-01-01

## 2021-01-01 VITALS — SYSTOLIC BLOOD PRESSURE: 83 MMHG | HEART RATE: 84 BPM | DIASTOLIC BLOOD PRESSURE: 63 MMHG

## 2021-01-01 DIAGNOSIS — I46.9 CARDIAC ARREST (HCC): Primary | ICD-10-CM

## 2021-01-01 DIAGNOSIS — I70.263 ATHEROSCLEROSIS OF NATIVE ARTERY OF BOTH LOWER EXTREMITIES WITH GANGRENE (HCC): ICD-10-CM

## 2021-01-01 DIAGNOSIS — E10.9 TYPE 1 DIABETES MELLITUS WITHOUT COMPLICATION (HCC): Primary | ICD-10-CM

## 2021-01-01 DIAGNOSIS — I70.263 ATHEROSCLEROSIS OF NATIVE ARTERY OF BOTH LOWER EXTREMITIES WITH GANGRENE (HCC): Primary | ICD-10-CM

## 2021-01-01 LAB — CREAT BLD-MCNC: 0.8 MG/DL (ref 0.6–1.3)

## 2021-01-01 PROCEDURE — 92950 HEART/LUNG RESUSCITATION CPR: CPT

## 2021-01-01 PROCEDURE — 75635 CT ANGIO ABDOMINAL ARTERIES: CPT

## 2021-01-01 PROCEDURE — 74011000250 HC RX REV CODE- 250

## 2021-01-01 PROCEDURE — 82565 ASSAY OF CREATININE: CPT

## 2021-01-01 PROCEDURE — 31500 INSERT EMERGENCY AIRWAY: CPT

## 2021-01-01 PROCEDURE — 74011250636 HC RX REV CODE- 250/636: Performed by: EMERGENCY MEDICINE

## 2021-01-01 PROCEDURE — 74011000636 HC RX REV CODE- 636: Performed by: RADIOLOGY

## 2021-01-01 PROCEDURE — 99284 EMERGENCY DEPT VISIT MOD MDM: CPT

## 2021-01-01 RX ORDER — IODIXANOL 320 MG/ML
200 INJECTION, SOLUTION INTRAVASCULAR
Status: COMPLETED | OUTPATIENT
Start: 2021-01-01 | End: 2021-01-01

## 2021-01-01 RX ORDER — EPINEPHRINE 0.1 MG/ML
1 INJECTION INTRACARDIAC; INTRAVENOUS
Status: COMPLETED | OUTPATIENT
Start: 2021-01-01 | End: 2021-01-01

## 2021-01-01 RX ORDER — SODIUM BICARBONATE 1 MEQ/ML
SYRINGE (ML) INTRAVENOUS
Status: COMPLETED
Start: 2021-01-01 | End: 2021-01-01

## 2021-01-01 RX ORDER — GABAPENTIN 300 MG/1
CAPSULE ORAL
Qty: 90 CAP | Refills: 1 | Status: SHIPPED | OUTPATIENT
Start: 2021-01-01

## 2021-01-01 RX ORDER — ATROPINE SULFATE 0.1 MG/ML
1 INJECTION INTRAVENOUS
Status: COMPLETED | OUTPATIENT
Start: 2021-01-01 | End: 2021-01-01

## 2021-01-01 RX ORDER — SEMAGLUTIDE 1.34 MG/ML
INJECTION, SOLUTION SUBCUTANEOUS
Qty: 1.5 ML | Refills: 1 | Status: SHIPPED | OUTPATIENT
Start: 2021-01-01

## 2021-01-01 RX ADMIN — ATROPINE SULFATE 1 MG: 0.1 INJECTION PARENTERAL at 16:38

## 2021-01-01 RX ADMIN — EPINEPHRINE 1 MG: 0.1 INJECTION, SOLUTION ENDOTRACHEAL; INTRACARDIAC; INTRAVENOUS at 16:35

## 2021-01-01 RX ADMIN — EPINEPHRINE 1 MG: 0.1 INJECTION, SOLUTION ENDOTRACHEAL; INTRACARDIAC; INTRAVENOUS at 16:32

## 2021-01-01 RX ADMIN — IODIXANOL 150 ML: 320 INJECTION, SOLUTION INTRAVASCULAR at 09:09

## 2021-01-01 RX ADMIN — SODIUM BICARBONATE: 84 INJECTION INTRAVENOUS at 16:49

## 2021-01-01 RX ADMIN — EPINEPHRINE 1 MG: 0.1 INJECTION, SOLUTION ENDOTRACHEAL; INTRACARDIAC; INTRAVENOUS at 16:39

## 2021-03-17 NOTE — ED PROVIDER NOTES
EMERGENCY DEPARTMENT HISTORY AND PHYSICAL EXAM 
 
 
Date: 3/17/2021 Patient Name: Polly Acosta History of Presenting Illness Chief Complaint Patient presents with  Cardiac arrest  
 
 
History Provided By: Patient HPI: Polly Acosta, 72 y.o. female with a past medical history significant hypertension and hyperlipidemia presents to the ED with cc of shortness of breath that then turned into cardiac arrest upon arrival by EMS. Patient received 4 doses of epinephrine prior to arrival as well as 3 doses in the emergency room with 2 doses of bicarb 2 doses of atropine a liter of fluid and epinephrine drip. Ultimately those resuscitation attempts were unsuccessful and after 45 minutes without a sustainable pulse the patient was pronounced . Family was at the bedside. Findings were confirmed with ultrasound. There are no other complaints, changes, or physical findings at this time. PCP: Ravinder Caraballo MD 
 
Current Facility-Administered Medications Medication Dose Route Frequency Provider Last Rate Last Admin  EPINEPHrine (ADRENALIN) 10 mg in 0.9% sodium chloride 250 mL infusion  0-10 mcg/min IntraVENous TITRATE Kolby Cueva MD      
 EPINEPHrine (ADRENALIN) 0.1 mg/mL syringe 1 mg  1 mg IntraVENous NOW Kolby Cueva MD      
 EPINEPHrine (ADRENALIN) 0.1 mg/mL syringe 1 mg  1 mg IntraVENous NOW Kolby Cueva MD      
 atropine injection 1 mg  1 mg IntraVENous NOW Kolby Cueva MD      
 EPINEPHrine (ADRENALIN) 0.1 mg/mL syringe 1 mg  1 mg IntraVENous NOW Kolby Cueva MD      
 sodium bicarbonate 8.4 % (1 mEq/mL) injection  EPINEPHrine (ADRENALIN) 0.1 mg/mL syringe 1 mg  1 mg IntraVENous NOW Kolby Cueva MD      
 
Current Outpatient Medications Medication Sig Dispense Refill  gabapentin (NEURONTIN) 300 mg capsule TAKE ONE CAPSULE BY MOUTH EVERY MORNING AND 2 CAPSULES EVERY NIGHT AT BEDTIME 90 Cap 1  
 Ozempic 0.25 mg or 0.5 mg(2 mg/1.5 mL) sub-q pen INJECT 0.25 MG UNDER THE SKIN EVERY 7 DAYS FOR FIRST 3 WEEKS THEN INCREASE TO 0.5 MG EVERY WEEK THEREAFTER 1.5 mL 1  
 Apidra U-100 Insulin 100 unit/mL injection USE WITH PUMP AS DIRECTED. MAX OF 50 UNITS DAILY 20 mL 3  
 umeclidinium (Incruse Ellipta) 62.5 mcg/actuation inhaler Take 1 Puff by inhalation daily.  gabapentin (NEURONTIN) 300 mg capsule Take 600 mg by mouth nightly.  albuterol (Ventolin HFA) 90 mcg/actuation inhaler Take 2 Puffs by inhalation every four (4) hours as needed for Wheezing.  TURMERIC PO Take 500 mg by mouth daily.  varenicline (Chantix) 1 mg tablet Take 1 mg by mouth two (2) times daily (after meals).  metoprolol tartrate (LOPRESSOR) 25 mg tablet Take 12.5 mg by mouth two (2) times a day.  multivitamin (ONE A DAY) tablet Take 1 Tab by mouth daily.  empagliflozin (Jardiance) 25 mg tablet TAKE 1 TABLET BY MOUTH DAILY. STOP INVOKANA 30 Tab 6  fluticasone/umeclidin/vilanter (TRELEGY ELLIPTA IN) Take  by inhalation.  fluconazole (DIFLUCAN) 150 mg tablet Take  by mouth daily as needed.  lidocaine (LIDODERM) 5 % apply 1 patch to affected area for 12 hours and remove for 12 hours  meclizine (ANTIVERT) 25 mg tablet Take 25 mg by mouth two (2) times daily as needed.  traMADol (ULTRAM) 50 mg tablet every six (6) hours as needed.  metFORMIN (GLUCOPHAGE) 1,000 mg tablet Take 1 Tab by mouth two (2) times daily (with meals). Stop Glucovance 60 Tab 6  clopidogrel (PLAVIX) 75 mg tab Take 75 mg by mouth daily.  CALCIUM CARBONATE/VITAMIN D3 (CALCIUM 600 + D,3, PO) Take  by mouth.  biotin 10 mg tab Take 1 Tab by mouth daily.  CRANBERRY FRUIT PO Take 1 Cap by mouth daily.  valACYclovir (VALTREX) 500 mg tablet Take 500 mg by mouth daily. 4  
 traZODone (DESYREL) 100 mg tablet Take 200 mg by mouth nightly.  simvastatin (ZOCOR) 10 mg tablet Take 10 mg by mouth nightly.   5  
 cholecalciferol (VITAMIN D3) 1,000 unit tablet Take  by mouth daily.  ascorbic acid (VITAMIN C) 1,000 mg tablet Take  by mouth.  ferrous sulfate (IRON) 325 mg (65 mg iron) tablet Take 65 mg by mouth daily.  losartan (COZAAR) 25 mg tablet Take 25 mg by mouth daily.  Insulin Needles, Disposable, (NOVOFINE 32) 32 x 1/4 \" ndle Use 4 times daily 200 Each 6  
 cyanocobalamin (VITAMIN B-12) 1,000 mcg tablet Take 5,000 mcg by mouth daily.  aspirin 81 mg tablet Take 81 mg by mouth daily.  omega-3 fatty acids-vitamin e (FISH OIL) 1,000 mg Cap Take 1 Cap by mouth daily.  LECITH/CA CARB/E/SOYB/BLK COH (ONE-A-DAY MENOPAUSE HEALTH PO) Take 1 Tab by mouth daily. Past History Past Medical History: 
Past Medical History:  
Diagnosis Date  Arrhythmia  CAD (coronary artery disease) 2018  
 2  cardiac stents  Depression  DM (diabetes mellitus) ([facility] [address] TYPE II  
 Gastroparesis 01/2014  PAD (peripheral artery disease) ([facility] [address] 01/2015  Thromboembolus ([facility] [address] 1993 RT. LEG Past Surgical History: 
Past Surgical History:  
Procedure Laterality Date  HX ARTHROPLASTY  HX BREAST REDUCTION Bilateral 03/2013  HX BUNIONECTOMY  HX GI  2012, 2019 COLONOSCOPY  
 HX LAP CHOLECYSTECTOMY  2010  HX ORTHOPAEDIC Right 02/07/2020  
 hardware removal and bone spur right foot  HX OSTEOTOMY  HX OTHER SURGICAL    
 right foot surgery X2 hammertoe surgery  HX OTHER SURGICAL  12/2016  
 foot surgery  REMOVAL GALLBLADDER Family History: 
Family History Problem Relation Age of Onset  Diabetes Mother  Hypertension Mother  Heart Disease Mother Social History: 
Social History Tobacco Use  Smoking status: Current Every Day Smoker Packs/day: 0.75 Years: 20.00 Pack years: 15.00  Smokeless tobacco: Never Used Substance Use Topics  Alcohol use: Not Currently  Drug use: No  
 
 
Allergies:  
Allergies Allergen Reactions  Levaquin [Levofloxacin] Rash  Pcn [Penicillins] Hives Patient tolerated ancef challenge in preop 7/9/20.  Robaxin [Methocarbamol] Rash Patient not allergic  Rocephin [Ceftriaxone] Rash Review of Systems Review of Systems Unable to perform ROS: Acuity of condition Physical Exam  
 
Physical Exam 
Vitals signs and nursing note reviewed. Constitutional:   
   General: She is not in acute distress. Appearance: She is well-developed. HENT:  
   Head: Normocephalic and atraumatic. Nose: Nose normal.  
   Mouth/Throat:  
   Pharynx: No oropharyngeal exudate. Comments: Intubated Eyes:  
   General:     
   Right eye: No discharge. Left eye: No discharge. Comments: Pupils fixed and dilated Cardiovascular:  
   Chest Wall: PMI is not displaced. No thrill. Heart sounds: Normal heart sounds. No murmur. No friction rub. No gallop. Comments: No appreciable cardiac activity Pulmonary:  
   Effort: Pulmonary effort is normal. No respiratory distress. Breath sounds: Normal breath sounds. No wheezing or rales. Chest:  
   Chest wall: No tenderness. Abdominal:  
   General: There is no distension. Palpations: Abdomen is soft. Musculoskeletal:  
   Comments: Unable to assess Lymphadenopathy:  
   Cervical: No cervical adenopathy. Skin: 
   Findings: No erythema or rash. Comments: Cool. Surgical wounds over left inguinal and right inner thigh Neurological:  
   Cranial Nerves: No cranial nerve deficit. Coordination: Coordination normal.  
   Comments: Unable to assess Psychiatric:  
   Comments: Unable to assess Lab and Diagnostic Study Results Labs - No results found for this or any previous visit (from the past 12 hour(s)). Radiologic Studies -  
[unfilled] CT Results  (Last 48 hours) None CXR Results  (Last 48 hours) None Medical Decision Making and ED Course - I am the first and primary provider for this patient AND AM THE PRIMARY PROVIDER OF RECORD. - I reviewed the vital signs, available nursing notes, past medical history, past surgical history, family history and social history. - Initial assessment performed. The patients presenting problems have been discussed, and the staff are in agreement with the care plan formulated and outlined with them. I have encouraged them to ask questions as they arise throughout their visit. Vital Signs-Reviewed the patient's vital signs. Patient Vitals for the past 12 hrs: 
 Pulse BP  
03/17/21 1642 84 (!) 83/63  
03/17/21 1630 (!) 108  Records Reviewed: Nursing Notes please review the nursing notes with regard to the cardiopulmonary resuscitation sequence as well as medications administered including times and intervals. The patient presents with acute cardiac arrest with a differential diagnosis of acute coronary insufficiency. Given the patient's lengthy medical problems as well as recent surgery we do not feel there is a need for autopsy at this point in time as the cause of death appears to be acute coronary insufficiency. ED Course:  
 
 
  
 
 
Provider Notes (Medical Decision Making): [facility] Consultations:  
 
 
Consultations: -  Pastoral services Procedures and Critical Care Performed by: Theresa Velarde MD 
PROCEDURES: 
Procedures CRITICAL CARE NOTE : 
5:10 PM 
Amount of Critical Care Time: 35(minutes) IMPENDING DETERIORATION -Cardiovascular ASSOCIATED RISK FACTORS - Dysrhythmia MANAGEMENT- Bedside Assessment and Supervision of Care INTERPRETATION -  ECG and Blood Pressure INTERVENTIONS - hemodynamic mngmt CASE REVIEW - Family TREATMENT RESPONSE -Unchanged PERFORMED BY - Self NOTES   : 
I have spent critical care time involved in lab review, consultations with specialist, family decision- making, bedside attention and documentation. This time excludes time spent in any separate billed procedures. During this entire length of time I was immediately available to the patient . Fransisco Quiroz MD 
 
 
 
Disposition Disposition: Condition completed  Diagnosis Clinical Impression: 1. Cardiac arrest ([address] Attestations: 
 
Fransisco Quiroz MD 
 
Please note that this dictation was completed with [employer], the computer voice recognition software. Quite often unanticipated grammatical, syntax, homophones, and other interpretive errors are inadvertently transcribed by the computer software. Please disregard these errors. Please excuse any errors that have escaped final proofreading. Thank you.

## 2021-03-19 NOTE — ED TRIAGE NOTES
Pt arrives via ems after sitting with daughter at kitchen table and going \"unconscious\". CPR in progress at this time.

## (undated) DEVICE — CANISTER, RIGID, 3000CC: Brand: MEDLINE INDUSTRIES, INC.

## (undated) DEVICE — SUT PROL 6-0 24IN BV1 DA BLU --

## (undated) DEVICE — BLANKET WRM W35.4XL86.6IN FULL UNDERBODY + FORC AIR

## (undated) DEVICE — VASCULAR-RICHMOND-LF: Brand: MEDLINE INDUSTRIES, INC.

## (undated) DEVICE — SUTURE ABSORBABLE BRAIDED 2-0 CT-1 27 IN UD VICRYL J259H

## (undated) DEVICE — STAPLER SKIN 35CT WD STRL DISP -- MULTIFIRE PREMIUM

## (undated) DEVICE — SUTURE VCRL SZ 3-0 L27IN ABSRB UD L26MM SH 1/2 CIR J416H

## (undated) DEVICE — STRAP,POSITIONING,KNEE/BODY,FOAM,4X60": Brand: MEDLINE

## (undated) DEVICE — TOTAL TRAY, 16FR 10ML SIL FOLEY, URN: Brand: MEDLINE

## (undated) DEVICE — SUTURE PROL SZ 6-0 L30IN NONABSORBABLE BLU L13MM RB-2 1/2 8711H

## (undated) DEVICE — DERMABOND SKIN ADH 0.7ML -- DERMABOND ADVANCED 12/BX

## (undated) DEVICE — DRAPE,REIN 53X77,STERILE: Brand: MEDLINE

## (undated) DEVICE — PREP SKN CHLRAPRP APL 26ML STR --

## (undated) DEVICE — REM POLYHESIVE ADULT PATIENT RETURN ELECTRODE: Brand: VALLEYLAB

## (undated) DEVICE — TOWEL SURG W17XL27IN STD BLU COT NONFENESTRATED PREWASHED

## (undated) DEVICE — Device

## (undated) DEVICE — SUTURE MCRYL SZ 4-0 L27IN ABSRB UD L19MM PS-2 1/2 CIR PRIM Y426H

## (undated) DEVICE — FLOSEAL MATRIX IS INDICATED IN SURGICAL PROCEDURES (OTHER THAN IN OPHTHALMIC) AS AN ADJUNCT TO HEMOSTASIS WHEN CONTROL OF BLEEDING BY LIGATURE OR CONVENTIONALPROCEDURES IS INEFFECTIVE OR IMPRACTICAL.: Brand: FLOSEAL HEMOSTATIC MATRIX

## (undated) DEVICE — COVER LT HNDL PLAS RIG 1 PER PK

## (undated) DEVICE — SUTURE PROL 5-0 L18IN NONABSORBABLE BLU RB-2 L13MM 1/2 CIR 8713H

## (undated) DEVICE — Z DISCONTINUED USE 2425483 (LOW STOCK PER MEDLINE) TAPE UMB L18IN DIA1/8IN WHT COT NONABSORBABLE W/O NDL FOR

## (undated) DEVICE — STERILE POLYISOPRENE POWDER-FREE SURGICAL GLOVES: Brand: PROTEXIS

## (undated) DEVICE — TELFA ADHESIVE ISLAND DRESSING: Brand: TELFA

## (undated) DEVICE — SUTURE VCRL SZ 2-0 L27IN ABSRB UD L26MM SH 1/2 CIR J417H

## (undated) DEVICE — SYR 5ML 1/5 GRAD LL NSAF LF --

## (undated) DEVICE — BLADE ASSEMB CLP HAIR FINE --

## (undated) DEVICE — INFECTION CONTROL KIT SYS